# Patient Record
Sex: FEMALE | Race: WHITE | NOT HISPANIC OR LATINO
[De-identification: names, ages, dates, MRNs, and addresses within clinical notes are randomized per-mention and may not be internally consistent; named-entity substitution may affect disease eponyms.]

---

## 2017-03-30 ENCOUNTER — APPOINTMENT (OUTPATIENT)
Dept: INTERNAL MEDICINE | Facility: CLINIC | Age: 67
End: 2017-03-30

## 2017-06-06 ENCOUNTER — OUTPATIENT (OUTPATIENT)
Dept: OUTPATIENT SERVICES | Facility: HOSPITAL | Age: 67
LOS: 1 days | End: 2017-06-06
Payer: MEDICARE

## 2017-06-06 ENCOUNTER — APPOINTMENT (OUTPATIENT)
Dept: CT IMAGING | Facility: CLINIC | Age: 67
End: 2017-06-06

## 2017-06-06 DIAGNOSIS — Z00.8 ENCOUNTER FOR OTHER GENERAL EXAMINATION: ICD-10-CM

## 2017-06-06 PROCEDURE — G0297: CPT

## 2018-01-08 ENCOUNTER — EMERGENCY (EMERGENCY)
Facility: HOSPITAL | Age: 68
LOS: 1 days | Discharge: HOME | End: 2018-01-08
Attending: EMERGENCY MEDICINE | Admitting: STUDENT IN AN ORGANIZED HEALTH CARE EDUCATION/TRAINING PROGRAM
Payer: MEDICARE

## 2018-01-08 VITALS
DIASTOLIC BLOOD PRESSURE: 63 MMHG | SYSTOLIC BLOOD PRESSURE: 126 MMHG | HEART RATE: 72 BPM | OXYGEN SATURATION: 96 % | TEMPERATURE: 98 F | RESPIRATION RATE: 20 BRPM

## 2018-01-08 VITALS
HEIGHT: 62 IN | DIASTOLIC BLOOD PRESSURE: 79 MMHG | OXYGEN SATURATION: 95 % | HEART RATE: 106 BPM | WEIGHT: 115.08 LBS | SYSTOLIC BLOOD PRESSURE: 151 MMHG | TEMPERATURE: 98 F | RESPIRATION RATE: 20 BRPM

## 2018-01-08 LAB
ALBUMIN SERPL ELPH-MCNC: 3.8 G/DL — SIGNIFICANT CHANGE UP (ref 3.3–5.2)
ALP SERPL-CCNC: 66 U/L — SIGNIFICANT CHANGE UP (ref 40–120)
ALT FLD-CCNC: 11 U/L — SIGNIFICANT CHANGE UP
ANION GAP SERPL CALC-SCNC: 14 MMOL/L — SIGNIFICANT CHANGE UP (ref 5–17)
APTT BLD: 32 SEC — SIGNIFICANT CHANGE UP (ref 27.5–37.4)
AST SERPL-CCNC: 16 U/L — SIGNIFICANT CHANGE UP
BILIRUB SERPL-MCNC: 0.3 MG/DL — LOW (ref 0.4–2)
BUN SERPL-MCNC: 18 MG/DL — SIGNIFICANT CHANGE UP (ref 8–20)
CALCIUM SERPL-MCNC: 10 MG/DL — SIGNIFICANT CHANGE UP (ref 8.6–10.2)
CHLORIDE SERPL-SCNC: 101 MMOL/L — SIGNIFICANT CHANGE UP (ref 98–107)
CK SERPL-CCNC: 46 U/L — SIGNIFICANT CHANGE UP (ref 25–170)
CK SERPL-CCNC: 48 U/L — SIGNIFICANT CHANGE UP (ref 25–170)
CO2 SERPL-SCNC: 28 MMOL/L — SIGNIFICANT CHANGE UP (ref 22–29)
CREAT SERPL-MCNC: 0.87 MG/DL — SIGNIFICANT CHANGE UP (ref 0.5–1.3)
GLUCOSE SERPL-MCNC: 134 MG/DL — HIGH (ref 70–115)
HCT VFR BLD CALC: 47 % — SIGNIFICANT CHANGE UP (ref 37–47)
HGB BLD-MCNC: 15.2 G/DL — SIGNIFICANT CHANGE UP (ref 12–16)
INR BLD: 1.04 RATIO — SIGNIFICANT CHANGE UP (ref 0.88–1.16)
MCHC RBC-ENTMCNC: 30 PG — SIGNIFICANT CHANGE UP (ref 27–31)
MCHC RBC-ENTMCNC: 32.3 G/DL — SIGNIFICANT CHANGE UP (ref 32–36)
MCV RBC AUTO: 92.7 FL — SIGNIFICANT CHANGE UP (ref 81–99)
PLATELET # BLD AUTO: 257 K/UL — SIGNIFICANT CHANGE UP (ref 150–400)
POTASSIUM SERPL-MCNC: 3.8 MMOL/L — SIGNIFICANT CHANGE UP (ref 3.5–5.3)
POTASSIUM SERPL-SCNC: 3.8 MMOL/L — SIGNIFICANT CHANGE UP (ref 3.5–5.3)
PROT SERPL-MCNC: 6.6 G/DL — SIGNIFICANT CHANGE UP (ref 6.6–8.7)
PROTHROM AB SERPL-ACNC: 11.5 SEC — SIGNIFICANT CHANGE UP (ref 9.8–12.7)
RBC # BLD: 5.07 M/UL — SIGNIFICANT CHANGE UP (ref 4.4–5.2)
RBC # FLD: 14.7 % — SIGNIFICANT CHANGE UP (ref 11–15.6)
SODIUM SERPL-SCNC: 143 MMOL/L — SIGNIFICANT CHANGE UP (ref 135–145)
TROPONIN T SERPL-MCNC: <0.01 NG/ML — SIGNIFICANT CHANGE UP (ref 0–0.06)
TROPONIN T SERPL-MCNC: <0.01 NG/ML — SIGNIFICANT CHANGE UP (ref 0–0.06)
WBC # BLD: 6.9 K/UL — SIGNIFICANT CHANGE UP (ref 4.8–10.8)
WBC # FLD AUTO: 6.9 K/UL — SIGNIFICANT CHANGE UP (ref 4.8–10.8)

## 2018-01-08 PROCEDURE — 84484 ASSAY OF TROPONIN QUANT: CPT

## 2018-01-08 PROCEDURE — 36415 COLL VENOUS BLD VENIPUNCTURE: CPT

## 2018-01-08 PROCEDURE — 83690 ASSAY OF LIPASE: CPT

## 2018-01-08 PROCEDURE — 93010 ELECTROCARDIOGRAM REPORT: CPT | Mod: 76

## 2018-01-08 PROCEDURE — 93005 ELECTROCARDIOGRAM TRACING: CPT

## 2018-01-08 PROCEDURE — 85610 PROTHROMBIN TIME: CPT

## 2018-01-08 PROCEDURE — 99285 EMERGENCY DEPT VISIT HI MDM: CPT | Mod: 25

## 2018-01-08 PROCEDURE — 99284 EMERGENCY DEPT VISIT MOD MDM: CPT | Mod: 25

## 2018-01-08 PROCEDURE — 85730 THROMBOPLASTIN TIME PARTIAL: CPT

## 2018-01-08 PROCEDURE — 82550 ASSAY OF CK (CPK): CPT

## 2018-01-08 PROCEDURE — 85027 COMPLETE CBC AUTOMATED: CPT

## 2018-01-08 PROCEDURE — 80053 COMPREHEN METABOLIC PANEL: CPT

## 2018-01-08 NOTE — ED ADULT NURSE NOTE - OBJECTIVE STATEMENT
Pt states last night she started to have some epigastric abdominal pain s/p eating ritz crackers which contain soy and soy doesn't agree with her well, she was able to fall asleep after tums but woke up in worse pain, was dizzy while ambulating and pain increased so she came to ED, pt denies n/v/d, denies sick contacts, denies any medical history, denies pain in ED

## 2018-01-08 NOTE — ED PROVIDER NOTE - MEDICAL DECISION MAKING DETAILS
Will evaluate epigastric pain, likely secondary to GI upset, given previous reaction to soy based products, however, given age and active smoking status will check cardiac enzymes.

## 2018-01-08 NOTE — ED PROVIDER NOTE - OBJECTIVE STATEMENT
66 y/o female smoker presents to the ED with c/o sub-sternal chest pain radiating towards back, onset 0300 this morning. Pt states she ate crackers last night with soy, per pt soy "bothers her." Pt reports lightheadedness secondary to pain and abdominal discomfort. Attempted to alleviate with Rolaid. Pt states she was evaluated by cardiologist in the past secondary to positive family cardiac history, negative stress test and ECG a few years ago. Pt denies dizziness, n/v, and any other acute symptoms and complaints at this time.   PMD: Dr. Mann 68 y/o female smoker presents to the ED with c/o sub-sternal chest pain radiating towards back, onset 0300 this morning. Pt states she ate crackers last night with soy, per pt soy "bothers her stomach usually." Pt reports lightheadedness secondary to pain and abdominal discomfort. Attempted to alleviate with Rolaids.  States once EMS arrived symptoms were improving. Upon arrival at the ED she was completely back to baseline. Pt states she was evaluated by cardiologist in the past secondary to positive family cardiac history, negative stress test and ECG a few years ago. Pt denies dizziness, n/v, and any other acute symptoms and complaints at this time. She admits to recurrent gastritis/gerd symptoms and "lives on Maalox" due to sensitivity to many of the GI medications used.  PMD: Dr. Mann

## 2018-01-08 NOTE — ED PROVIDER NOTE - PROGRESS NOTE DETAILS
Received patient signout from Dr. Rose patient with epigastric pain after eating a soy based cracker.  Patient is a tobacco user smokes 1ppd asymptomatic at this time.  Pending 2nd set @ 9. Patient remains asymptomatic.

## 2018-10-04 ENCOUNTER — OTHER (OUTPATIENT)
Age: 68
End: 2018-10-04

## 2018-11-12 ENCOUNTER — NON-APPOINTMENT (OUTPATIENT)
Age: 68
End: 2018-11-12

## 2018-11-12 ENCOUNTER — APPOINTMENT (OUTPATIENT)
Dept: INTERNAL MEDICINE | Facility: CLINIC | Age: 68
End: 2018-11-12
Payer: MEDICARE

## 2018-11-12 VITALS
BODY MASS INDEX: 21.09 KG/M2 | SYSTOLIC BLOOD PRESSURE: 170 MMHG | WEIGHT: 119 LBS | DIASTOLIC BLOOD PRESSURE: 60 MMHG | HEIGHT: 63 IN

## 2018-11-12 DIAGNOSIS — Z00.00 ENCOUNTER FOR GENERAL ADULT MEDICAL EXAMINATION W/OUT ABNORMAL FINDINGS: ICD-10-CM

## 2018-11-12 DIAGNOSIS — F17.200 NICOTINE DEPENDENCE, UNSPECIFIED, UNCOMPLICATED: ICD-10-CM

## 2018-11-12 PROCEDURE — 36415 COLL VENOUS BLD VENIPUNCTURE: CPT

## 2018-11-12 PROCEDURE — 93000 ELECTROCARDIOGRAM COMPLETE: CPT | Mod: 59

## 2018-11-12 PROCEDURE — 99406 BEHAV CHNG SMOKING 3-10 MIN: CPT

## 2018-11-12 PROCEDURE — G0439: CPT

## 2018-11-12 NOTE — COUNSELING
[ - Behavioral Counseling for Obesity, Group (2-10) 30 Minutes] : Behavioral Counseling for Obesity, Group (2-10) 30 Minutes [Smoking cessation counseling provided] : smoking cessation [Tobacco Use Cessation Intermediate Greater Than 3 Minutes Up to 10 Minutes] : Tobacco Use Cessation Intermediate Greater Than 3 Minutes Up to 10 Minutes

## 2018-11-12 NOTE — REVIEW OF SYSTEMS
[Constipation] : constipation [Negative] : Heme/Lymph [Chest Pain] : no chest pain [Palpitations] : no palpitations [Orthopnea] : no orthopnea [Abdominal Pain] : no abdominal pain [FreeTextEntry6] : cig abuse [FreeTextEntry7] : to see gi

## 2018-11-12 NOTE — HEALTH RISK ASSESSMENT
[Excellent] : ~his/her~  mood as  excellent [No falls in past year] : Patient reported no falls in the past year [1] : 2) Feeling down, depressed, or hopeless for several days (1) [HIV test declined] : HIV test declined [Hepatitis C test declined] : Hepatitis C test declined [Behavioral] : behavioral [] : No [[id number]] : 2 [[id number]] : 2 [Patient reported mammogram was normal] : Patient reported mammogram was normal [Patient declined bone density test] : Patient declined bone density test [Patient reported colonoscopy was normal] : Patient reported colonoscopy was normal [Change in mental status noted] : No change in mental status noted [Language] : denies difficulty with language [Behavior] : denies difficulty with behavior [Learning/Retaining New Information] : denies difficulty learning/retaining new information [Handling Complex Tasks] : denies difficulty handling complex tasks [Reasoning] : denies difficulty with reasoning [Spatial Ability and Orientation] : denies difficulty with spatial ability and orientation [MammogramDate] : [id number] [ColonoscopyDate] : 01/01/2011 [ColonoscopyComments] : pending [HepatitisCDate] : 10/01/2017 [HepatitisCComments] : neg

## 2018-11-12 NOTE — HISTORY OF PRESENT ILLNESS
[FreeTextEntry1] : yearly wellness [de-identified] :  routine wellness exam. In the past year she lost her daughter due to diabetes. She has been on the quite a lot of stress states she is otherwise doing well. She has no new complaints\par She refuses all vaccinations for many years

## 2018-11-12 NOTE — PLAN
[FreeTextEntry1] : Wellness exam completed. All issues of health and screening up to date. Immunizations and screening reviewed with patient. All issues of health for the current year discussed in depth with patient. Patient was conversant during the exam and all pertinent issues addressed. Depression screen 2 in chart. Fall prevention was addressed.\par \par Patient was sent for screening mammogram, refuses bone density and all immunizations, and referred to GI for colonoscopy. In addition she states she will continue to smoke and a yearly CAT scan order was placed for low dose CAT scan screening.\par \par The laboratory data was drawn and despite a history of low vitamin D the patient states she will not take vitamin D.\par \par The echocardiogram was of normal limits and she will followup in one year.\par All issues regarding patient's health and medical problems have been discussed. The patient understands and concurs with the treatment plan.

## 2018-11-13 ENCOUNTER — TRANSCRIPTION ENCOUNTER (OUTPATIENT)
Age: 68
End: 2018-11-13

## 2018-11-13 LAB
25(OH)D3 SERPL-MCNC: 14.7 NG/ML
ALBUMIN SERPL ELPH-MCNC: 3.9 G/DL
ALP BLD-CCNC: 65 U/L
ALT SERPL-CCNC: 12 U/L
ANION GAP SERPL CALC-SCNC: 14 MMOL/L
APPEARANCE: CLEAR
AST SERPL-CCNC: 18 U/L
BACTERIA: NEGATIVE
BASOPHILS # BLD AUTO: 0.05 K/UL
BASOPHILS NFR BLD AUTO: 0.6 %
BILIRUB SERPL-MCNC: 0.2 MG/DL
BILIRUBIN URINE: NEGATIVE
BLOOD URINE: NEGATIVE
BUN SERPL-MCNC: 23 MG/DL
CALCIUM SERPL-MCNC: 9.6 MG/DL
CHLORIDE SERPL-SCNC: 104 MMOL/L
CHOLEST SERPL-MCNC: 218 MG/DL
CHOLEST/HDLC SERPL: 3.5 RATIO
CO2 SERPL-SCNC: 26 MMOL/L
COLOR: YELLOW
CREAT SERPL-MCNC: 0.9 MG/DL
EOSINOPHIL # BLD AUTO: 0.23 K/UL
EOSINOPHIL NFR BLD AUTO: 2.6 %
GLUCOSE QUALITATIVE U: NEGATIVE MG/DL
GLUCOSE SERPL-MCNC: 100 MG/DL
HBA1C MFR BLD HPLC: 5.7 %
HCT VFR BLD CALC: 45.6 %
HDLC SERPL-MCNC: 62 MG/DL
HGB BLD-MCNC: 14.8 G/DL
HYALINE CASTS: 5 /LPF
IMM GRANULOCYTES NFR BLD AUTO: 0.3 %
KETONES URINE: NEGATIVE
LDLC SERPL CALC-MCNC: 128 MG/DL
LEUKOCYTE ESTERASE URINE: ABNORMAL
LYMPHOCYTES # BLD AUTO: 2.68 K/UL
LYMPHOCYTES NFR BLD AUTO: 29.9 %
MAN DIFF?: NORMAL
MCHC RBC-ENTMCNC: 30.5 PG
MCHC RBC-ENTMCNC: 32.5 GM/DL
MCV RBC AUTO: 93.8 FL
MICROSCOPIC-UA: NORMAL
MONOCYTES # BLD AUTO: 0.65 K/UL
MONOCYTES NFR BLD AUTO: 7.3 %
NEUTROPHILS # BLD AUTO: 5.31 K/UL
NEUTROPHILS NFR BLD AUTO: 59.3 %
NITRITE URINE: NEGATIVE
PH URINE: 5
PLATELET # BLD AUTO: 337 K/UL
POTASSIUM SERPL-SCNC: 4.8 MMOL/L
PROT SERPL-MCNC: 6.4 G/DL
PROTEIN URINE: NEGATIVE MG/DL
RBC # BLD: 4.86 M/UL
RBC # FLD: 14.8 %
RED BLOOD CELLS URINE: 1 /HPF
SODIUM SERPL-SCNC: 144 MMOL/L
SPECIFIC GRAVITY URINE: 1.02
SQUAMOUS EPITHELIAL CELLS: 4 /HPF
TRIGL SERPL-MCNC: 140 MG/DL
TSH SERPL-ACNC: 1.46 UIU/ML
UROBILINOGEN URINE: NEGATIVE MG/DL
WBC # FLD AUTO: 8.95 K/UL
WHITE BLOOD CELLS URINE: 7 /HPF

## 2018-12-14 ENCOUNTER — APPOINTMENT (OUTPATIENT)
Dept: GASTROENTEROLOGY | Facility: CLINIC | Age: 68
End: 2018-12-14
Payer: MEDICARE

## 2018-12-14 VITALS
BODY MASS INDEX: 20.55 KG/M2 | HEIGHT: 63 IN | OXYGEN SATURATION: 98 % | WEIGHT: 116 LBS | RESPIRATION RATE: 15 BRPM | DIASTOLIC BLOOD PRESSURE: 87 MMHG | SYSTOLIC BLOOD PRESSURE: 165 MMHG | HEART RATE: 89 BPM

## 2018-12-14 DIAGNOSIS — Z86.010 PERSONAL HISTORY OF COLONIC POLYPS: ICD-10-CM

## 2018-12-14 DIAGNOSIS — K57.90 DIVERTICULOSIS OF INTESTINE, PART UNSPECIFIED, W/OUT PERFORATION OR ABSCESS W/OUT BLEEDING: ICD-10-CM

## 2018-12-14 DIAGNOSIS — R12 HEARTBURN: ICD-10-CM

## 2018-12-14 DIAGNOSIS — K59.00 CONSTIPATION, UNSPECIFIED: ICD-10-CM

## 2018-12-14 DIAGNOSIS — Z80.0 FAMILY HISTORY OF MALIGNANT NEOPLASM OF DIGESTIVE ORGANS: ICD-10-CM

## 2018-12-14 DIAGNOSIS — Z12.11 ENCOUNTER FOR SCREENING FOR MALIGNANT NEOPLASM OF COLON: ICD-10-CM

## 2018-12-14 DIAGNOSIS — Z80.9 FAMILY HISTORY OF MALIGNANT NEOPLASM, UNSPECIFIED: ICD-10-CM

## 2018-12-14 PROCEDURE — 82272 OCCULT BLD FECES 1-3 TESTS: CPT

## 2018-12-14 PROCEDURE — 99214 OFFICE O/P EST MOD 30 MIN: CPT

## 2019-01-21 ENCOUNTER — FORM ENCOUNTER (OUTPATIENT)
Age: 69
End: 2019-01-21

## 2019-01-22 ENCOUNTER — OUTPATIENT (OUTPATIENT)
Dept: OUTPATIENT SERVICES | Facility: HOSPITAL | Age: 69
LOS: 1 days | End: 2019-01-22
Payer: MEDICARE

## 2019-01-22 ENCOUNTER — APPOINTMENT (OUTPATIENT)
Dept: CT IMAGING | Facility: CLINIC | Age: 69
End: 2019-01-22
Payer: MEDICARE

## 2019-01-22 DIAGNOSIS — Z12.2 ENCOUNTER FOR SCREENING FOR MALIGNANT NEOPLASM OF RESPIRATORY ORGANS: ICD-10-CM

## 2019-01-22 DIAGNOSIS — Z13.29 ENCOUNTER FOR SCREENING FOR OTHER SUSPECTED ENDOCRINE DISORDER: ICD-10-CM

## 2019-01-22 DIAGNOSIS — Z13.1 ENCOUNTER FOR SCREENING FOR DIABETES MELLITUS: ICD-10-CM

## 2019-01-22 DIAGNOSIS — Z13.220 ENCOUNTER FOR SCREENING FOR LIPOID DISORDERS: ICD-10-CM

## 2019-01-22 DIAGNOSIS — Z87.891 PERSONAL HISTORY OF NICOTINE DEPENDENCE: ICD-10-CM

## 2019-01-22 PROCEDURE — G0297: CPT | Mod: 26

## 2019-01-22 PROCEDURE — G0297: CPT

## 2019-01-24 ENCOUNTER — TRANSCRIPTION ENCOUNTER (OUTPATIENT)
Age: 69
End: 2019-01-24

## 2019-03-07 ENCOUNTER — TRANSCRIPTION ENCOUNTER (OUTPATIENT)
Age: 69
End: 2019-03-07

## 2019-03-13 ENCOUNTER — OUTPATIENT (OUTPATIENT)
Dept: OUTPATIENT SERVICES | Facility: HOSPITAL | Age: 69
LOS: 1 days | Discharge: HOME | End: 2019-03-13
Payer: MEDICARE

## 2019-03-13 ENCOUNTER — APPOINTMENT (OUTPATIENT)
Dept: GASTROENTEROLOGY | Facility: GI CENTER | Age: 69
End: 2019-03-13
Payer: MEDICARE

## 2019-03-13 ENCOUNTER — RESULT REVIEW (OUTPATIENT)
Age: 69
End: 2019-03-13

## 2019-03-13 DIAGNOSIS — Z12.11 ENCOUNTER FOR SCREENING FOR MALIGNANT NEOPLASM OF COLON: ICD-10-CM

## 2019-03-13 PROCEDURE — 88305 TISSUE EXAM BY PATHOLOGIST: CPT

## 2019-03-13 PROCEDURE — 88305 TISSUE EXAM BY PATHOLOGIST: CPT | Mod: 26

## 2019-03-13 PROCEDURE — 45380 COLONOSCOPY AND BIOPSY: CPT

## 2019-03-13 RX ORDER — IBUPROFEN 200 MG/1
TABLET, COATED ORAL
Refills: 0 | Status: ACTIVE | COMMUNITY

## 2019-03-13 RX ORDER — CALCIUM CARBONATE 500 MG/1
500 TABLET, CHEWABLE ORAL
Refills: 0 | Status: ACTIVE | COMMUNITY

## 2019-03-13 NOTE — REASON FOR VISIT
[Procedure: _________] : a [unfilled] procedure visit [FreeTextEntry1] : colonoscopy [Colonoscopy] : a colonoscopy [FreeTextEntry2] : change in bowel habits, constipation

## 2019-03-13 NOTE — PROCEDURE
[With Polypectomy] : polypectomy [Procedure Explained] : The procedure was explained [Allergies Reviewed] : allergies reviewed. [Risks] : Risks [Benefits] : benefits [Alternatives] : alternatives [Bleeding] : bleeding risk [Infection] : risk of infection [Consent Obtained] : written consent was obtained prior to the procedure and is detailed in the patient's record [Patient] : the patient [Bowel Prep Kit] : the patient took the appropriate bowel preparation kit as directed [Approved Diet Followed] : the patient avoided solid foods and adhered to the approved diet list for 24 hours prior to the procedure [Automated Blood Pressure Cuff] : automated blood pressure cuff [Cardiac Monitor] : cardiac monitor [Pulse Oximeter] : pulse oximeter [2] : 2 [Sedation Clearance] : the patient was cleared for moderate sedation [Prep Qualtiy: ___] : Prep Quality:  [unfilled] [Withdrawal Time: ___] : Withdrawal Time:  [unfilled] [Performed By: ___] : Performed by:  RADAMES [Left Lateral Decubitus] : The patient was positioned in the left lateral decubitus position [Abnormal Rectum] : a normal rectum [External Hemorrhoids] : no external hemorrhoids [Cecum (Landmarks/Transillum)] : and guided to the cecum which was identified by the anatomic landmarks of the appendiceal orifice and ileocecal valve and by transillumination in the right lower quadrant [Insufflated] : insufflated [Single Pass Needed] : after a single pass [Retroflex View] : a retroflex view of the rectum was performed [Patient Rotated Into Alternating Positions] : the patient was not rotated [Normal] : Normal [Diverticulosis] : diverticulosis [Hemorrhoids] : hemorrhoids [Sent to Pathology] : was sent to pathology for analysis [Vital Signs Stable] : the vital signs were stable [No Complications] : There were no complications [de-identified] : change in bowel habits [de-identified] :  There were four 3 mm sessile polyps removed with cold forceps. two diverticula noted

## 2019-03-13 NOTE — ASSESSMENT
[FreeTextEntry1] : A/P\par 4 small polyps removed\par colonoscopy in 3-5 years\par diverticulosis, hemorrhoids

## 2019-03-13 NOTE — PROCEDURE
[With Polypectomy] : polypectomy [Procedure Explained] : The procedure was explained [Allergies Reviewed] : allergies reviewed. [Risks] : Risks [Benefits] : benefits [Alternatives] : alternatives [Bleeding] : bleeding risk [Infection] : risk of infection [Consent Obtained] : written consent was obtained prior to the procedure and is detailed in the patient's record [Patient] : the patient [Bowel Prep Kit] : the patient took the appropriate bowel preparation kit as directed [Approved Diet Followed] : the patient avoided solid foods and adhered to the approved diet list for 24 hours prior to the procedure [Automated Blood Pressure Cuff] : automated blood pressure cuff [Cardiac Monitor] : cardiac monitor [Pulse Oximeter] : pulse oximeter [2] : 2 [Sedation Clearance] : the patient was cleared for moderate sedation [Prep Qualtiy: ___] : Prep Quality:  [unfilled] [Withdrawal Time: ___] : Withdrawal Time:  [unfilled] [Performed By: ___] : Performed by:  RADAMES [Left Lateral Decubitus] : The patient was positioned in the left lateral decubitus position [Abnormal Rectum] : a normal rectum [External Hemorrhoids] : no external hemorrhoids [Cecum (Landmarks/Transillum)] : and guided to the cecum which was identified by the anatomic landmarks of the appendiceal orifice and ileocecal valve and by transillumination in the right lower quadrant [Insufflated] : insufflated [Single Pass Needed] : after a single pass [Retroflex View] : a retroflex view of the rectum was performed [Patient Rotated Into Alternating Positions] : the patient was not rotated [Normal] : Normal [Diverticulosis] : diverticulosis [Hemorrhoids] : hemorrhoids [Sent to Pathology] : was sent to pathology for analysis [Vital Signs Stable] : the vital signs were stable [No Complications] : There were no complications [de-identified] : change in bowel habits [de-identified] :  There were four 3 mm sessile polyps removed with cold forceps. two diverticula noted

## 2019-03-15 LAB — SURGICAL PATHOLOGY STUDY: SIGNIFICANT CHANGE UP

## 2019-03-25 ENCOUNTER — APPOINTMENT (OUTPATIENT)
Dept: GASTROENTEROLOGY | Facility: GI CENTER | Age: 69
End: 2019-03-25

## 2019-03-29 ENCOUNTER — APPOINTMENT (OUTPATIENT)
Dept: GASTROENTEROLOGY | Facility: CLINIC | Age: 69
End: 2019-03-29

## 2019-04-16 ENCOUNTER — APPOINTMENT (OUTPATIENT)
Dept: INTERNAL MEDICINE | Facility: CLINIC | Age: 69
End: 2019-04-16
Payer: MEDICARE

## 2019-04-16 VITALS
SYSTOLIC BLOOD PRESSURE: 180 MMHG | DIASTOLIC BLOOD PRESSURE: 80 MMHG | WEIGHT: 117 LBS | HEIGHT: 63 IN | BODY MASS INDEX: 20.73 KG/M2

## 2019-04-16 DIAGNOSIS — K76.89 OTHER SPECIFIED DISEASES OF LIVER: ICD-10-CM

## 2019-04-16 DIAGNOSIS — R10.9 UNSPECIFIED ABDOMINAL PAIN: ICD-10-CM

## 2019-04-16 PROCEDURE — 99213 OFFICE O/P EST LOW 20 MIN: CPT

## 2019-04-16 RX ORDER — RANITIDINE 150 MG/1
150 TABLET ORAL DAILY
Qty: 30 | Refills: 3 | Status: DISCONTINUED | COMMUNITY
Start: 2018-12-14 | End: 2019-04-16

## 2019-04-16 NOTE — PHYSICAL EXAM
[No Acute Distress] : no acute distress [Well Nourished] : well nourished [Well Developed] : well developed [Well-Appearing] : well-appearing [Normal Sclera/Conjunctiva] : normal sclera/conjunctiva [PERRL] : pupils equal round and reactive to light [EOMI] : extraocular movements intact [Normal Outer Ear/Nose] : the outer ears and nose were normal in appearance [Normal Oropharynx] : the oropharynx was normal [Supple] : supple [No JVD] : no jugular venous distention [No Respiratory Distress] : no respiratory distress  [No Lymphadenopathy] : no lymphadenopathy [Thyroid Normal, No Nodules] : the thyroid was normal and there were no nodules present [Clear to Auscultation] : lungs were clear to auscultation bilaterally [Normal Rate] : normal rate  [No Accessory Muscle Use] : no accessory muscle use [Normal S1, S2] : normal S1 and S2 [Regular Rhythm] : with a regular rhythm [No Murmur] : no murmur heard [No Abdominal Bruit] : a ~M bruit was not heard ~T in the abdomen [No Carotid Bruits] : no carotid bruits [No Varicosities] : no varicosities [Pedal Pulses Present] : the pedal pulses are present [No Edema] : there was no peripheral edema [No Extremity Clubbing/Cyanosis] : no extremity clubbing/cyanosis [No Palpable Aorta] : no palpable aorta [Soft] : abdomen soft [Non-distended] : non-distended [Non Tender] : non-tender [No HSM] : no HSM [No Masses] : no abdominal mass palpated [Normal Posterior Cervical Nodes] : no posterior cervical lymphadenopathy [Normal Anterior Cervical Nodes] : no anterior cervical lymphadenopathy [Normal Bowel Sounds] : normal bowel sounds [No Spinal Tenderness] : no spinal tenderness [No CVA Tenderness] : no CVA  tenderness [No Joint Swelling] : no joint swelling [No Rash] : no rash [Grossly Normal Strength/Tone] : grossly normal strength/tone [Coordination Grossly Intact] : coordination grossly intact [Normal Gait] : normal gait [Deep Tendon Reflexes (DTR)] : deep tendon reflexes were 2+ and symmetric [No Focal Deficits] : no focal deficits [Normal Affect] : the affect was normal [Normal Insight/Judgement] : insight and judgment were intact [de-identified] : Area of discomfort the left lower quadrant corresponds to palpation of her lower rib

## 2019-04-16 NOTE — PLAN
[FreeTextEntry1] : She has normal carotid artery evaluation. The lump she felt in the lower left side is floating rib. Normal examination. Patient is concerned because daughter with polycystic kidney disease so a screening sonogram was ordered of her liver and kidneys. Her blood pressure was slightly elevated we'll follow up in 2 months

## 2019-04-16 NOTE — HISTORY OF PRESENT ILLNESS
[FreeTextEntry1] : wants to go over cat scan of lungs/ and history of cysts [de-identified] : DAUGHTER WITH PCKD - PT CONCERNED\par CONCERNED ABOUT CAROTIDS  - SL DIZZY \par LITTLE LUMP ON SIDE

## 2019-04-25 ENCOUNTER — FORM ENCOUNTER (OUTPATIENT)
Age: 69
End: 2019-04-25

## 2019-04-26 ENCOUNTER — OUTPATIENT (OUTPATIENT)
Dept: OUTPATIENT SERVICES | Facility: HOSPITAL | Age: 69
LOS: 1 days | End: 2019-04-26
Payer: MEDICARE

## 2019-04-26 ENCOUNTER — APPOINTMENT (OUTPATIENT)
Dept: ULTRASOUND IMAGING | Facility: CLINIC | Age: 69
End: 2019-04-26
Payer: MEDICARE

## 2019-04-26 DIAGNOSIS — R10.9 UNSPECIFIED ABDOMINAL PAIN: ICD-10-CM

## 2019-04-26 PROCEDURE — 76700 US EXAM ABDOM COMPLETE: CPT | Mod: 26

## 2019-04-26 PROCEDURE — 76700 US EXAM ABDOM COMPLETE: CPT

## 2019-06-03 ENCOUNTER — APPOINTMENT (OUTPATIENT)
Dept: GASTROENTEROLOGY | Facility: GI CENTER | Age: 69
End: 2019-06-03

## 2019-07-17 ENCOUNTER — APPOINTMENT (OUTPATIENT)
Dept: INTERNAL MEDICINE | Facility: CLINIC | Age: 69
End: 2019-07-17
Payer: MEDICARE

## 2019-07-17 VITALS
WEIGHT: 115 LBS | BODY MASS INDEX: 20.38 KG/M2 | HEIGHT: 63 IN | DIASTOLIC BLOOD PRESSURE: 75 MMHG | SYSTOLIC BLOOD PRESSURE: 170 MMHG

## 2019-07-17 PROCEDURE — 99212 OFFICE O/P EST SF 10 MIN: CPT

## 2019-08-19 NOTE — ED ADULT NURSE NOTE - CHIEF COMPLAINT
Bed Search Update:  [facility]-no beds  [facility]-no beds  [facility]-no beds The patient is a 67y Female complaining of chest pain.

## 2019-08-21 NOTE — ASSESSMENT
[FreeTextEntry1] : Patient examined and adjustments to therapy for HBP WAS not required as multiple blood pressure readings were approximately 150/75. Patient's own blood pressure cuff was incorrect as it read at 115/70 and therefore is unreliable. Patient is extremely anxious and therefore the blood pressure readings and optic were helpful. The treatment will be offered All labs reviewed with patient as well. Review of systems and physical exam discussed with patient. Care plan for future followups discussed with patient. All issues of health for the current year discussed in depth with patient who was conversant and all relevant issues addressed.

## 2019-08-21 NOTE — HISTORY OF PRESENT ILLNESS
[FreeTextEntry1] : blood pressure check [de-identified] : Patient has been monitoring her blood pressure home and is getting low readings. She comes in today for blood pressure check. She is otherwise without sxs and has been un controlled in past

## 2019-08-29 ENCOUNTER — APPOINTMENT (OUTPATIENT)
Dept: INTERNAL MEDICINE | Facility: CLINIC | Age: 69
End: 2019-08-29
Payer: MEDICARE

## 2019-08-29 VITALS
DIASTOLIC BLOOD PRESSURE: 70 MMHG | HEIGHT: 63 IN | BODY MASS INDEX: 20.38 KG/M2 | WEIGHT: 115 LBS | SYSTOLIC BLOOD PRESSURE: 150 MMHG

## 2019-08-29 DIAGNOSIS — R09.89 OTHER SPECIFIED SYMPTOMS AND SIGNS INVOLVING THE CIRCULATORY AND RESPIRATORY SYSTEMS: ICD-10-CM

## 2019-08-29 PROCEDURE — 99214 OFFICE O/P EST MOD 30 MIN: CPT

## 2019-08-29 NOTE — PHYSICAL EXAM
[No Acute Distress] : no acute distress [Well Nourished] : well nourished [Well Developed] : well developed [Well-Appearing] : well-appearing [Normal Sclera/Conjunctiva] : normal sclera/conjunctiva [PERRL] : pupils equal round and reactive to light [EOMI] : extraocular movements intact [Normal Outer Ear/Nose] : the outer ears and nose were normal in appearance [Normal Oropharynx] : the oropharynx was normal [No JVD] : no jugular venous distention [Supple] : supple [No Lymphadenopathy] : no lymphadenopathy [Thyroid Normal, No Nodules] : the thyroid was normal and there were no nodules present [No Respiratory Distress] : no respiratory distress  [No Accessory Muscle Use] : no accessory muscle use [Clear to Auscultation] : lungs were clear to auscultation bilaterally [Normal Rate] : normal rate  [Regular Rhythm] : with a regular rhythm [Normal S1, S2] : normal S1 and S2 [No Murmur] : no murmur heard [No Carotid Bruits] : no carotid bruits [No Abdominal Bruit] : a ~M bruit was not heard ~T in the abdomen [No Varicosities] : no varicosities [Pedal Pulses Present] : the pedal pulses are present [No Edema] : there was no peripheral edema [No Palpable Aorta] : no palpable aorta [No Extremity Clubbing/Cyanosis] : no extremity clubbing/cyanosis [Soft] : abdomen soft [Non-distended] : non-distended [Non Tender] : non-tender [No Masses] : no abdominal mass palpated [Normal Bowel Sounds] : normal bowel sounds [No HSM] : no HSM [Normal Posterior Cervical Nodes] : no posterior cervical lymphadenopathy [Normal Anterior Cervical Nodes] : no anterior cervical lymphadenopathy [No CVA Tenderness] : no CVA  tenderness [No Joint Swelling] : no joint swelling [No Spinal Tenderness] : no spinal tenderness [Grossly Normal Strength/Tone] : grossly normal strength/tone [No Rash] : no rash [Coordination Grossly Intact] : coordination grossly intact [No Focal Deficits] : no focal deficits [Normal Gait] : normal gait [Deep Tendon Reflexes (DTR)] : deep tendon reflexes were 2+ and symmetric [Normal Affect] : the affect was normal [Normal Insight/Judgement] : insight and judgment were intact

## 2019-08-29 NOTE — HISTORY OF PRESENT ILLNESS
[de-identified] : Patient is here in followup to hypertension. She's been obsessing over her blood pressure readings at home which have been elevated as well as increased heart rate. She's under stress with illness of her daughter. Her blood pressure has been borderline but she has refused therapy in the past [FreeTextEntry1] : Patient here in follow up to last visit and prior issue\par

## 2019-08-29 NOTE — ASSESSMENT
[FreeTextEntry1] : Patient examined and adjustments to therapy for HBP required this visit as patient given metoprolol tartrate 5 ER daily. This is both for moderate control of her blood pressure and heart rate. He should also help with anxiety. Had a long conversation with the patient regarding the need for treatment at this time and she can All labs reviewed with patient as well. Review of systems and physical exam discussed with patient. Care plan for future followups discussed with patient. All issues of health for the current year discussed in depth with patient who was conversant and all relevant issues addressed.\par \par All issues regarding patient's health and medical problems have been discussed. The patient understands and concurs with the treatment plan.

## 2019-10-15 ENCOUNTER — APPOINTMENT (OUTPATIENT)
Dept: INTERNAL MEDICINE | Facility: CLINIC | Age: 69
End: 2019-10-15
Payer: MEDICARE

## 2019-10-15 VITALS
WEIGHT: 116 LBS | DIASTOLIC BLOOD PRESSURE: 60 MMHG | SYSTOLIC BLOOD PRESSURE: 180 MMHG | BODY MASS INDEX: 20.55 KG/M2 | HEIGHT: 63 IN

## 2019-10-15 PROCEDURE — 99213 OFFICE O/P EST LOW 20 MIN: CPT

## 2019-10-15 NOTE — ASSESSMENT
[FreeTextEntry1] : Patient examined and adjustments to therapy for HBP might be required the patient has difficulty with medications. All labs reviewed with patient as well. Review of systems and physical exam discussed with patient. Care plan for future followups discussed with patient. All issues of health for the current year discussed in depth with patient who was conversant and all relevant issues addressed.\par Plan is to maintain metoprolol half a day for now and slowly increase to full pill and observe for side effects. Her pressure is suboptimal amount the patient will self monitor at home. Alternative medications including verapamil were discussed to control both her elevated heart rate and hypertension. This will be discussed at next visit\par

## 2019-10-15 NOTE — PHYSICAL EXAM
[No Acute Distress] : no acute distress [Well Nourished] : well nourished [Well Developed] : well developed [Well-Appearing] : well-appearing [Normal Sclera/Conjunctiva] : normal sclera/conjunctiva [PERRL] : pupils equal round and reactive to light [EOMI] : extraocular movements intact [Normal Oropharynx] : the oropharynx was normal [Normal Outer Ear/Nose] : the outer ears and nose were normal in appearance [No JVD] : no jugular venous distention [No Lymphadenopathy] : no lymphadenopathy [Supple] : supple [No Respiratory Distress] : no respiratory distress  [Thyroid Normal, No Nodules] : the thyroid was normal and there were no nodules present [Clear to Auscultation] : lungs were clear to auscultation bilaterally [No Accessory Muscle Use] : no accessory muscle use [Normal Rate] : normal rate  [Regular Rhythm] : with a regular rhythm [No Carotid Bruits] : no carotid bruits [Normal S1, S2] : normal S1 and S2 [No Murmur] : no murmur heard [No Varicosities] : no varicosities [No Abdominal Bruit] : a ~M bruit was not heard ~T in the abdomen [No Edema] : there was no peripheral edema [Pedal Pulses Present] : the pedal pulses are present [No Extremity Clubbing/Cyanosis] : no extremity clubbing/cyanosis [No Palpable Aorta] : no palpable aorta [Non Tender] : non-tender [Soft] : abdomen soft [Non-distended] : non-distended [No Masses] : no abdominal mass palpated [No HSM] : no HSM [Normal Bowel Sounds] : normal bowel sounds [Normal Posterior Cervical Nodes] : no posterior cervical lymphadenopathy [Normal Anterior Cervical Nodes] : no anterior cervical lymphadenopathy [No CVA Tenderness] : no CVA  tenderness [No Spinal Tenderness] : no spinal tenderness [No Joint Swelling] : no joint swelling [Grossly Normal Strength/Tone] : grossly normal strength/tone [No Rash] : no rash [Coordination Grossly Intact] : coordination grossly intact [No Focal Deficits] : no focal deficits [Normal Gait] : normal gait [Normal Affect] : the affect was normal [Deep Tendon Reflexes (DTR)] : deep tendon reflexes were 2+ and symmetric [Normal Insight/Judgement] : insight and judgment were intact

## 2019-10-15 NOTE — HISTORY OF PRESENT ILLNESS
[FreeTextEntry1] : Patient here in follow up to last visit and prior issue\par  [de-identified] : Patient reduced meds to .5 pill\par Patient here for hypertension checkup. She complained of onset of headache since menopause self reduce dose to half pill a day. She also feels sluggish

## 2019-12-26 ENCOUNTER — APPOINTMENT (OUTPATIENT)
Dept: INTERNAL MEDICINE | Facility: CLINIC | Age: 69
End: 2019-12-26

## 2020-01-13 PROBLEM — Z13.29 SCREENING FOR THYROID DISORDER: Status: ACTIVE | Noted: 2018-11-12

## 2020-01-14 ENCOUNTER — NON-APPOINTMENT (OUTPATIENT)
Age: 70
End: 2020-01-14

## 2020-01-14 ENCOUNTER — APPOINTMENT (OUTPATIENT)
Dept: INTERNAL MEDICINE | Facility: CLINIC | Age: 70
End: 2020-01-14
Payer: MEDICARE

## 2020-01-14 VITALS
BODY MASS INDEX: 20.55 KG/M2 | WEIGHT: 116 LBS | DIASTOLIC BLOOD PRESSURE: 70 MMHG | HEIGHT: 63 IN | SYSTOLIC BLOOD PRESSURE: 150 MMHG

## 2020-01-14 DIAGNOSIS — Z13.29 ENCOUNTER FOR SCREENING FOR OTHER SUSPECTED ENDOCRINE DISORDER: ICD-10-CM

## 2020-01-14 PROCEDURE — 99214 OFFICE O/P EST MOD 30 MIN: CPT | Mod: 25

## 2020-01-14 PROCEDURE — 99406 BEHAV CHNG SMOKING 3-10 MIN: CPT

## 2020-01-14 PROCEDURE — 36415 COLL VENOUS BLD VENIPUNCTURE: CPT

## 2020-01-14 PROCEDURE — G0439: CPT

## 2020-01-14 PROCEDURE — G0296 VISIT TO DETERM LDCT ELIG: CPT

## 2020-01-14 PROCEDURE — G0444 DEPRESSION SCREEN ANNUAL: CPT

## 2020-01-14 PROCEDURE — G0405: CPT

## 2020-01-14 RX ORDER — CALCIUM CARB/VITAMIN D3/VIT K1 650MG-12.5
TABLET,CHEWABLE ORAL
Refills: 0 | Status: ACTIVE | COMMUNITY

## 2020-01-14 RX ORDER — CALCIUM PHOSPHATE TRIB/VIT D3 250-0.01MG
250-400 TABLET,CHEWABLE ORAL
Refills: 0 | Status: DISCONTINUED | COMMUNITY
End: 2020-01-14

## 2020-01-14 NOTE — ADDENDUM
[FreeTextEntry1] : Patient examined and adjustments to therapy for HBP not required as normal today All labs reviewed with patient as well. Review of systems and physical exam discussed with patient. Care plan for future followups discussed with patient. All issues of health for the current year discussed in depth with patient who was conversant and all relevant issues addressed.\par \par Wellness exam completed. All issues of health and screening up to date. Immunizations and screening reviewed with patient. All issues of health for the current year discussed in depth with patient. Patient was conversant during the exam and all pertinent issues addressed. Depression screen 2 in chart. Fall prevention was addressed.\par \par Patient was also screened for lung cancer and smoking cessation and will followup for yearly annual CAT scan and still will continue smoking\par \par All issues regarding patient's health and medical problems have been discussed. The patient understands and concurs with the treatment plan.

## 2020-01-14 NOTE — HISTORY OF PRESENT ILLNESS
[FreeTextEntry1] : Annual wellness and Patient here in follow up to last visit and prior issue\par  [de-identified] : Patient here routine followup to her multiple medical problems. She continues to smoke. She required as her yearly screening CAT scan. She is compliant with all her medications and up-to-date on screening. She still refuses vaccinations

## 2020-01-14 NOTE — PHYSICAL EXAM
[No Acute Distress] : no acute distress [Well Nourished] : well nourished [Well Developed] : well developed [Well-Appearing] : well-appearing [Normal Sclera/Conjunctiva] : normal sclera/conjunctiva [PERRL] : pupils equal round and reactive to light [Normal Outer Ear/Nose] : the outer ears and nose were normal in appearance [EOMI] : extraocular movements intact [No Lymphadenopathy] : no lymphadenopathy [Normal Oropharynx] : the oropharynx was normal [No JVD] : no jugular venous distention [Supple] : supple [Thyroid Normal, No Nodules] : the thyroid was normal and there were no nodules present [No Respiratory Distress] : no respiratory distress  [No Accessory Muscle Use] : no accessory muscle use [Normal Rate] : normal rate  [Regular Rhythm] : with a regular rhythm [Clear to Auscultation] : lungs were clear to auscultation bilaterally [Normal S1, S2] : normal S1 and S2 [No Murmur] : no murmur heard [No Carotid Bruits] : no carotid bruits [No Varicosities] : no varicosities [Pedal Pulses Present] : the pedal pulses are present [No Abdominal Bruit] : a ~M bruit was not heard ~T in the abdomen [No Palpable Aorta] : no palpable aorta [No Edema] : there was no peripheral edema [Soft] : abdomen soft [No Extremity Clubbing/Cyanosis] : no extremity clubbing/cyanosis [Non Tender] : non-tender [No Masses] : no abdominal mass palpated [Non-distended] : non-distended [No HSM] : no HSM [Normal Posterior Cervical Nodes] : no posterior cervical lymphadenopathy [Normal Bowel Sounds] : normal bowel sounds [Normal Anterior Cervical Nodes] : no anterior cervical lymphadenopathy [No Spinal Tenderness] : no spinal tenderness [No CVA Tenderness] : no CVA  tenderness [No Joint Swelling] : no joint swelling [Grossly Normal Strength/Tone] : grossly normal strength/tone [No Rash] : no rash [Coordination Grossly Intact] : coordination grossly intact [Normal Gait] : normal gait [No Focal Deficits] : no focal deficits [Normal Affect] : the affect was normal [Normal Insight/Judgement] : insight and judgment were intact [Deep Tendon Reflexes (DTR)] : deep tendon reflexes were 2+ and symmetric

## 2020-01-14 NOTE — HISTORY OF PRESENT ILLNESS
[FreeTextEntry1] : Annual wellness and Patient here in follow up to last visit and prior issue\par  [de-identified] : Patient here routine followup to her multiple medical problems. She continues to smoke. She required as her yearly screening CAT scan. She is compliant with all her medications and up-to-date on screening. She still refuses vaccinations

## 2020-01-14 NOTE — END OF VISIT
[FreeTextEntry2] : All labs reviewed :\par \par  [>50% of Time Spent on Counseling for ____] : Greater than 50% of the encounter time was spent on counseling for [unfilled] [Time Spent: ___ minutes] : I have spent [unfilled] minutes of face to face time with the patient

## 2020-01-14 NOTE — HEALTH RISK ASSESSMENT
[Very Good] : ~his/her~  mood as very good [] : Yes [No] : No [No falls in past year] : Patient reported no falls in the past year [Patient reported colonoscopy was normal] : Patient reported colonoscopy was normal [HIV test declined] : HIV test declined [Patient reported mammogram was normal] : Patient reported mammogram was normal [Hepatitis C test declined] : Hepatitis C test declined [Fully functional (using the telephone, shopping, preparing meals, housekeeping, doing laundry, using] : Fully functional and needs no help or supervision to perform IADLs (using the telephone, shopping, preparing meals, housekeeping, doing laundry, using transportation, managing medications and managing finances) [Fully functional (bathing, dressing, toileting, transferring, walking, feeding)] : Fully functional (bathing, dressing, toileting, transferring, walking, feeding) [1] : 2) Feeling down, depressed, or hopeless for several days (1) [30 or more] : 30 or more [[id number]] : 2 [LowDoseCTScan] : 01/2019 [Language] : denies difficulty with language [Change in mental status noted] : No change in mental status noted [Handling Complex Tasks] : denies difficulty handling complex tasks [MammogramDate] : 01/16/2019 [ColonoscopyDate] : 03/12/2019

## 2020-01-14 NOTE — HEALTH RISK ASSESSMENT
[Very Good] : ~his/her~  mood as very good [] : Yes [No] : No [No falls in past year] : Patient reported no falls in the past year [Patient reported colonoscopy was normal] : Patient reported colonoscopy was normal [Patient reported mammogram was normal] : Patient reported mammogram was normal [HIV test declined] : HIV test declined [Hepatitis C test declined] : Hepatitis C test declined [Fully functional (bathing, dressing, toileting, transferring, walking, feeding)] : Fully functional (bathing, dressing, toileting, transferring, walking, feeding) [Fully functional (using the telephone, shopping, preparing meals, housekeeping, doing laundry, using] : Fully functional and needs no help or supervision to perform IADLs (using the telephone, shopping, preparing meals, housekeeping, doing laundry, using transportation, managing medications and managing finances) [1] : 2) Feeling down, depressed, or hopeless for several days (1) [30 or more] : 30 or more [YUI0Ntrug] : 2 [LowDoseCTScan] : 01/2019 [Language] : denies difficulty with language [Change in mental status noted] : No change in mental status noted [Handling Complex Tasks] : denies difficulty handling complex tasks [MammogramDate] : 01/16/2019 [ColonoscopyDate] : 03/12/2019

## 2020-01-14 NOTE — COUNSELING
[Risk of tobacco use and health benefits of smoking cessation discussed] : Risk of tobacco use and health benefits of smoking cessation discussed [Cessation strategies including cessation program discussed] : Cessation strategies including cessation program discussed [Tobacco Use Cessation Intermediate Greater Than 3 Minutes Up to 10 Minutes] : Tobacco Use Cessation Intermediate Greater Than 3 Minutes Up to 10 Minutes [ - Annual Lung Cancer Screening/Share Decision Making Discussion] : Annual Lung Cancer Screening/Share Decision Making Discussion. (I have advised this patient to have a Low Dose CT (LDCT) scan of the lungs and have discussed the following with the patient in a shared decision making discussion:   Benefits of Detection and Early Treatment: There is adequate evidence that annual screening for lung cancer with LDCT in a population of high-risk persons can prevent a substantial number of lung cancer–related deaths. The magnitude of benefit depends on the individual patient's risk for lung cancer, as those who are at highest risk are most likely to benefit. Screening cannot prevent most lung cancer–related deaths, and does not replace smoking cessation. Harms of Detection and Early Intervention and Treatment: The harms associated with LDCT screening include false-negative and false-positive results, incidental findings, over diagnosis, and radiation exposure. False-positive LDCT results occur in a substantial proportion of screened persons; 95% of all positive results do not lead to a diagnosis of cancer. In a high-quality screening program, further imaging can resolve most false-positive results; however, some patients may require invasive procedures. Radiation harms, including cancer resulting from cumulative exposure to radiation, vary depending on the age at the start of screening; the number of scans received; and the person's exposure to other sources of radiation, particularly other medical imaging.) [None] : None [Willing to Quit Smoking] : Not willing to quit smoking [FreeTextEntry1] : 5

## 2020-01-14 NOTE — COUNSELING
[Risk of tobacco use and health benefits of smoking cessation discussed] : Risk of tobacco use and health benefits of smoking cessation discussed [Cessation strategies including cessation program discussed] : Cessation strategies including cessation program discussed [ - Annual Lung Cancer Screening/Share Decision Making Discussion] : Annual Lung Cancer Screening/Share Decision Making Discussion. (I have advised this patient to have a Low Dose CT (LDCT) scan of the lungs and have discussed the following with the patient in a shared decision making discussion:   Benefits of Detection and Early Treatment: There is adequate evidence that annual screening for lung cancer with LDCT in a population of high-risk persons can prevent a substantial number of lung cancer–related deaths. The magnitude of benefit depends on the individual patient's risk for lung cancer, as those who are at highest risk are most likely to benefit. Screening cannot prevent most lung cancer–related deaths, and does not replace smoking cessation. Harms of Detection and Early Intervention and Treatment: The harms associated with LDCT screening include false-negative and false-positive results, incidental findings, over diagnosis, and radiation exposure. False-positive LDCT results occur in a substantial proportion of screened persons; 95% of all positive results do not lead to a diagnosis of cancer. In a high-quality screening program, further imaging can resolve most false-positive results; however, some patients may require invasive procedures. Radiation harms, including cancer resulting from cumulative exposure to radiation, vary depending on the age at the start of screening; the number of scans received; and the person's exposure to other sources of radiation, particularly other medical imaging.) [Tobacco Use Cessation Intermediate Greater Than 3 Minutes Up to 10 Minutes] : Tobacco Use Cessation Intermediate Greater Than 3 Minutes Up to 10 Minutes [None] : None [Willing to Quit Smoking] : Not willing to quit smoking [FreeTextEntry1] : 5

## 2020-01-14 NOTE — REVIEW OF SYSTEMS
[Abdominal Pain] : abdominal pain [Nausea] : nausea [Constipation] : constipation [Negative] : Heme/Lymph [FreeTextEntry7] : neg colon

## 2020-01-14 NOTE — PHYSICAL EXAM
[No Acute Distress] : no acute distress [Well Nourished] : well nourished [Well Developed] : well developed [Well-Appearing] : well-appearing [Normal Sclera/Conjunctiva] : normal sclera/conjunctiva [PERRL] : pupils equal round and reactive to light [EOMI] : extraocular movements intact [Normal Outer Ear/Nose] : the outer ears and nose were normal in appearance [No JVD] : no jugular venous distention [No Lymphadenopathy] : no lymphadenopathy [Normal Oropharynx] : the oropharynx was normal [Thyroid Normal, No Nodules] : the thyroid was normal and there were no nodules present [Supple] : supple [No Accessory Muscle Use] : no accessory muscle use [No Respiratory Distress] : no respiratory distress  [Regular Rhythm] : with a regular rhythm [Normal Rate] : normal rate  [Clear to Auscultation] : lungs were clear to auscultation bilaterally [No Murmur] : no murmur heard [Normal S1, S2] : normal S1 and S2 [No Carotid Bruits] : no carotid bruits [No Varicosities] : no varicosities [No Abdominal Bruit] : a ~M bruit was not heard ~T in the abdomen [Pedal Pulses Present] : the pedal pulses are present [No Palpable Aorta] : no palpable aorta [No Edema] : there was no peripheral edema [Soft] : abdomen soft [No Extremity Clubbing/Cyanosis] : no extremity clubbing/cyanosis [Non Tender] : non-tender [Non-distended] : non-distended [No Masses] : no abdominal mass palpated [No HSM] : no HSM [Normal Bowel Sounds] : normal bowel sounds [Normal Posterior Cervical Nodes] : no posterior cervical lymphadenopathy [Normal Anterior Cervical Nodes] : no anterior cervical lymphadenopathy [No CVA Tenderness] : no CVA  tenderness [No Spinal Tenderness] : no spinal tenderness [No Joint Swelling] : no joint swelling [Grossly Normal Strength/Tone] : grossly normal strength/tone [No Rash] : no rash [Coordination Grossly Intact] : coordination grossly intact [No Focal Deficits] : no focal deficits [Normal Gait] : normal gait [Normal Insight/Judgement] : insight and judgment were intact [Normal Affect] : the affect was normal [Deep Tendon Reflexes (DTR)] : deep tendon reflexes were 2+ and symmetric

## 2020-01-15 LAB
25(OH)D3 SERPL-MCNC: 16.6 NG/ML
ALBUMIN SERPL ELPH-MCNC: 4 G/DL
ALP BLD-CCNC: 60 U/L
ALT SERPL-CCNC: 13 U/L
ANION GAP SERPL CALC-SCNC: 11 MMOL/L
APPEARANCE: CLEAR
AST SERPL-CCNC: 20 U/L
BACTERIA: NEGATIVE
BASOPHILS # BLD AUTO: 0.06 K/UL
BASOPHILS NFR BLD AUTO: 0.6 %
BILIRUB SERPL-MCNC: 0.3 MG/DL
BILIRUBIN URINE: NEGATIVE
BLOOD URINE: NEGATIVE
BUN SERPL-MCNC: 22 MG/DL
CALCIUM SERPL-MCNC: 9.6 MG/DL
CHLORIDE SERPL-SCNC: 101 MMOL/L
CHOLEST SERPL-MCNC: 240 MG/DL
CHOLEST/HDLC SERPL: 3.8 RATIO
CO2 SERPL-SCNC: 26 MMOL/L
COLOR: NORMAL
CREAT SERPL-MCNC: 1.04 MG/DL
EOSINOPHIL # BLD AUTO: 0.18 K/UL
EOSINOPHIL NFR BLD AUTO: 1.7 %
GLUCOSE QUALITATIVE U: NEGATIVE
GLUCOSE SERPL-MCNC: 98 MG/DL
HCT VFR BLD CALC: 49.4 %
HDLC SERPL-MCNC: 63 MG/DL
HGB BLD-MCNC: 15.1 G/DL
HYALINE CASTS: 1 /LPF
IMM GRANULOCYTES NFR BLD AUTO: 0.4 %
KETONES URINE: NEGATIVE
LDLC SERPL CALC-MCNC: 158 MG/DL
LEUKOCYTE ESTERASE URINE: NEGATIVE
LYMPHOCYTES # BLD AUTO: 2.88 K/UL
LYMPHOCYTES NFR BLD AUTO: 27.7 %
MAN DIFF?: NORMAL
MCHC RBC-ENTMCNC: 29 PG
MCHC RBC-ENTMCNC: 30.6 GM/DL
MCV RBC AUTO: 95 FL
MICROSCOPIC-UA: NORMAL
MONOCYTES # BLD AUTO: 0.82 K/UL
MONOCYTES NFR BLD AUTO: 7.9 %
NEUTROPHILS # BLD AUTO: 6.42 K/UL
NEUTROPHILS NFR BLD AUTO: 61.7 %
NITRITE URINE: NEGATIVE
PH URINE: 5.5
PLATELET # BLD AUTO: 327 K/UL
POTASSIUM SERPL-SCNC: 4.6 MMOL/L
PROT SERPL-MCNC: 6.7 G/DL
PROTEIN URINE: NEGATIVE
RBC # BLD: 5.2 M/UL
RBC # FLD: 13.9 %
RED BLOOD CELLS URINE: 0 /HPF
SODIUM SERPL-SCNC: 138 MMOL/L
SPECIFIC GRAVITY URINE: 1.01
SQUAMOUS EPITHELIAL CELLS: 2 /HPF
TRIGL SERPL-MCNC: 93 MG/DL
TSH SERPL-ACNC: 1.19 UIU/ML
UROBILINOGEN URINE: NORMAL
WBC # FLD AUTO: 10.4 K/UL
WHITE BLOOD CELLS URINE: 2 /HPF

## 2020-01-29 ENCOUNTER — FORM ENCOUNTER (OUTPATIENT)
Age: 70
End: 2020-01-29

## 2020-01-30 ENCOUNTER — APPOINTMENT (OUTPATIENT)
Dept: CT IMAGING | Facility: CLINIC | Age: 70
End: 2020-01-30
Payer: MEDICARE

## 2020-01-30 ENCOUNTER — OUTPATIENT (OUTPATIENT)
Dept: OUTPATIENT SERVICES | Facility: HOSPITAL | Age: 70
LOS: 1 days | End: 2020-01-30
Payer: COMMERCIAL

## 2020-01-30 VITALS — WEIGHT: 116 LBS | BODY MASS INDEX: 20.55 KG/M2 | HEIGHT: 63 IN

## 2020-01-30 DIAGNOSIS — Z13.89 ENCOUNTER FOR SCREENING FOR OTHER DISORDER: ICD-10-CM

## 2020-01-30 PROCEDURE — G0297: CPT | Mod: 26

## 2020-01-30 PROCEDURE — G0297: CPT

## 2020-01-30 NOTE — DATA REVIEWED
[Lung Cancer Screening] : Patient underwent lung cancer screening [TextBox_12] : 06/17 [2] : 2 [TextBox_27] : 01/19

## 2020-01-30 NOTE — ASSESSMENT
[Not Ready] : Patient is not ready for cessation intervention [Discussed Risks and Advised to Quit Smoking] : Discussed risks and advised to quit smoking

## 2020-01-30 NOTE — HISTORY OF PRESENT ILLNESS
[TextBox_13] : Referred by Dr. Danilo Mann.\par \par Ms. ARDON is a 70 year old female with a history of HTN, high cholesterol and emphysema.\par \par She was seen in the office today for review of eligibility for, as well as, Low-Dose CT lung cancer screening.  Reviewed and confirmed that the patient meets screening eligibility criteria:\par \par 70 years old \par \par Smoking Status: Current Smoker\par \par Number of pack(s) per day: 3/4\par Number of years smoked: 59\par Number of pack years smokin\par \par Ms. ARDON denies any symptoms of lung cancer, including new cough, change in cough, hemoptysis, and unintentional weight loss.\par \par Ms. ARDON denies any personal history of lung cancer.  No lung cancer in a first degree relative.  Denies any history of occupational exposures.

## 2020-01-30 NOTE — PLAN
[FreeTextEntry1] : - Low Dose CT chest for lung cancer screening.\par \par - Encouraged smoking cessation\par \par - Patient declined [facility] Smokers Quitline literature\par \par

## 2020-03-18 ENCOUNTER — APPOINTMENT (OUTPATIENT)
Dept: INTERNAL MEDICINE | Facility: CLINIC | Age: 70
End: 2020-03-18

## 2020-04-15 ENCOUNTER — APPOINTMENT (OUTPATIENT)
Dept: GASTROENTEROLOGY | Facility: CLINIC | Age: 70
End: 2020-04-15

## 2020-05-19 ENCOUNTER — APPOINTMENT (OUTPATIENT)
Dept: INTERNAL MEDICINE | Facility: CLINIC | Age: 70
End: 2020-05-19
Payer: MEDICARE

## 2020-05-19 VITALS
HEIGHT: 63 IN | BODY MASS INDEX: 20.55 KG/M2 | DIASTOLIC BLOOD PRESSURE: 80 MMHG | WEIGHT: 116 LBS | SYSTOLIC BLOOD PRESSURE: 180 MMHG

## 2020-05-19 PROCEDURE — 99214 OFFICE O/P EST MOD 30 MIN: CPT

## 2020-05-19 RX ORDER — METOPROLOL SUCCINATE 25 MG/1
25 TABLET, EXTENDED RELEASE ORAL
Qty: 90 | Refills: 3 | Status: DISCONTINUED | COMMUNITY
Start: 2019-08-29 | End: 2020-05-19

## 2020-05-19 NOTE — ASSESSMENT
[FreeTextEntry1] : Patient examined and adjustments to therapy for HBP is stable at the moment the does not need adjusting. Patient is quite anxious causing elevations at first reading All labs reviewed with patient as well. Review of systems and physical exam discussed with patient. Care plan for future followups discussed with patient. All issues of health for the current year discussed in depth with patient who was conversant and all relevant issues addressed.\par \par Cholesterol levels discussed with patient. Compliance with oral medication were also addressed . Ideal LDL levels were  discussed with the patient. All issues regarding the implications of high cholesterol necessity for treatment were discussed with the patient who is conversant and all relevant questions were asked and answered. 10 year risk assessment discussed with patient. It is approximately 20% due to hypertension and smoking. Patient is hesitant to take a statin and for now will have a coronary calcium score and discussed after\par \par All issues regarding patient's health and medical problems have been discussed. The patient understands and concurs with the treatment plan.

## 2020-05-19 NOTE — REVIEW OF SYSTEMS
[Negative] : Heme/Lymph [Abdominal Pain] : abdominal pain [Nausea] : nausea [Constipation] : constipation [FreeTextEntry7] : neg colon

## 2020-05-19 NOTE — PHYSICAL EXAM
[Well Nourished] : well nourished [No Acute Distress] : no acute distress [Well Developed] : well developed [Well-Appearing] : well-appearing [PERRL] : pupils equal round and reactive to light [Normal Sclera/Conjunctiva] : normal sclera/conjunctiva [Normal Outer Ear/Nose] : the outer ears and nose were normal in appearance [EOMI] : extraocular movements intact [Normal Oropharynx] : the oropharynx was normal [No JVD] : no jugular venous distention [No Lymphadenopathy] : no lymphadenopathy [Supple] : supple [Thyroid Normal, No Nodules] : the thyroid was normal and there were no nodules present [No Respiratory Distress] : no respiratory distress  [Clear to Auscultation] : lungs were clear to auscultation bilaterally [No Accessory Muscle Use] : no accessory muscle use [Regular Rhythm] : with a regular rhythm [Normal Rate] : normal rate  [Normal S1, S2] : normal S1 and S2 [No Abdominal Bruit] : a ~M bruit was not heard ~T in the abdomen [No Carotid Bruits] : no carotid bruits [No Murmur] : no murmur heard [No Varicosities] : no varicosities [Pedal Pulses Present] : the pedal pulses are present [No Palpable Aorta] : no palpable aorta [No Edema] : there was no peripheral edema [Soft] : abdomen soft [No Extremity Clubbing/Cyanosis] : no extremity clubbing/cyanosis [Non-distended] : non-distended [Non Tender] : non-tender [No HSM] : no HSM [No Masses] : no abdominal mass palpated [Normal Posterior Cervical Nodes] : no posterior cervical lymphadenopathy [Normal Bowel Sounds] : normal bowel sounds [No CVA Tenderness] : no CVA  tenderness [Normal Anterior Cervical Nodes] : no anterior cervical lymphadenopathy [No Spinal Tenderness] : no spinal tenderness [No Joint Swelling] : no joint swelling [No Rash] : no rash [Grossly Normal Strength/Tone] : grossly normal strength/tone [Coordination Grossly Intact] : coordination grossly intact [No Focal Deficits] : no focal deficits [Normal Gait] : normal gait [Normal Affect] : the affect was normal [Deep Tendon Reflexes (DTR)] : deep tendon reflexes were 2+ and symmetric [Normal Insight/Judgement] : insight and judgment were intact

## 2020-09-15 ENCOUNTER — APPOINTMENT (OUTPATIENT)
Dept: INTERNAL MEDICINE | Facility: CLINIC | Age: 70
End: 2020-09-15
Payer: MEDICARE

## 2020-09-15 VITALS
DIASTOLIC BLOOD PRESSURE: 60 MMHG | HEIGHT: 63 IN | TEMPERATURE: 97.1 F | WEIGHT: 119 LBS | BODY MASS INDEX: 21.09 KG/M2 | SYSTOLIC BLOOD PRESSURE: 145 MMHG

## 2020-09-15 PROCEDURE — 99214 OFFICE O/P EST MOD 30 MIN: CPT | Mod: 25

## 2020-09-15 PROCEDURE — 36415 COLL VENOUS BLD VENIPUNCTURE: CPT

## 2020-09-15 NOTE — PHYSICAL EXAM
[No Acute Distress] : no acute distress [Well Developed] : well developed [Well Nourished] : well nourished [Well-Appearing] : well-appearing [PERRL] : pupils equal round and reactive to light [Normal Sclera/Conjunctiva] : normal sclera/conjunctiva [EOMI] : extraocular movements intact [Normal Oropharynx] : the oropharynx was normal [Normal Outer Ear/Nose] : the outer ears and nose were normal in appearance [No JVD] : no jugular venous distention [No Lymphadenopathy] : no lymphadenopathy [Supple] : supple [No Respiratory Distress] : no respiratory distress  [Thyroid Normal, No Nodules] : the thyroid was normal and there were no nodules present [Clear to Auscultation] : lungs were clear to auscultation bilaterally [No Accessory Muscle Use] : no accessory muscle use [Normal S1, S2] : normal S1 and S2 [Regular Rhythm] : with a regular rhythm [Normal Rate] : normal rate  [No Murmur] : no murmur heard [No Carotid Bruits] : no carotid bruits [Pedal Pulses Present] : the pedal pulses are present [No Varicosities] : no varicosities [No Abdominal Bruit] : a ~M bruit was not heard ~T in the abdomen [No Edema] : there was no peripheral edema [No Extremity Clubbing/Cyanosis] : no extremity clubbing/cyanosis [No Palpable Aorta] : no palpable aorta [Soft] : abdomen soft [Non Tender] : non-tender [Non-distended] : non-distended [No Masses] : no abdominal mass palpated [No HSM] : no HSM [Normal Posterior Cervical Nodes] : no posterior cervical lymphadenopathy [Normal Bowel Sounds] : normal bowel sounds [No CVA Tenderness] : no CVA  tenderness [Normal Anterior Cervical Nodes] : no anterior cervical lymphadenopathy [No Spinal Tenderness] : no spinal tenderness [No Joint Swelling] : no joint swelling [Grossly Normal Strength/Tone] : grossly normal strength/tone [No Rash] : no rash [Coordination Grossly Intact] : coordination grossly intact [No Focal Deficits] : no focal deficits [Normal Gait] : normal gait [Deep Tendon Reflexes (DTR)] : deep tendon reflexes were 2+ and symmetric [Normal Affect] : the affect was normal [Normal Insight/Judgement] : insight and judgment were intact [de-identified] : nl memory

## 2020-09-15 NOTE — ASSESSMENT
[FreeTextEntry1] : Patient examined and adjustments to therapy for HBP [does not need adjustingAll labs reviewed with patient as well. Review of systems and physical exam discussed with patient. Care plan for future followups discussed with patient. All issues of health for the current year discussed in depth with patient who was conversant and all relevant issues addressed.\par \par Cholesterol levels discussed with patient. Compliance with oral medication were also addressed . Ideal LDL levels were  discussed with the patient. All issues regarding the implications of high cholesterol necessity for treatment were discussed with the patient who is conversant and all relevant questions were asked and answered.patient's LDL is markedly elevated with a 10 year risk of 36%. Patient had a coronary calcium score and has a strong family history of coronary artery disease. Lipitor was again recommended this visit at this time patient consents and will start10 mg a day and slowly increase. She still smokes and will not stop\par \par Patient complaining of mild memory searching issues but was 5 out of 5 with memory call at one and 5 minutes and no evidence of memory deficit\par \par All issues regarding patient's health and medical problems have been discussed. The patient understands and concurs with the treatment plan.

## 2020-09-15 NOTE — HISTORY OF PRESENT ILLNESS
[FreeTextEntry1] : Patient here in follow up to last visit and prior issue\par  [de-identified] : 10 Year risk k 20% due to smoking and HBP\par Patient in followup to several medical problems. She is tolerating her antihypertensives and blood pressure has been low at home. She has no complaints chest pain or shortness of breath she is also to discuss elevated cholesterol\par Refuses flu shot \par Patient also complaining of word searching at times without loss of memory or any other focal neurological events. She also hasn't shown full history coronary artery disease and did not have a coronary calcium

## 2020-09-15 NOTE — REVIEW OF SYSTEMS
[Abdominal Pain] : abdominal pain [Nausea] : nausea [Constipation] : constipation [Negative] : Gastrointestinal [FreeTextEntry7] : neg colon

## 2020-09-24 NOTE — ED ADULT TRIAGE NOTE - NSWEIGHTCALCTOOLDRUG_GEN_A_CORE
used Is This A New Presentation, Or A Follow-Up?: Skin Lesion What Type Of Note Output Would You Prefer (Optional)?: Standard Output How Severe Is Your Skin Lesion?: moderate Has Your Skin Lesion Been Treated?: not been treated

## 2020-11-28 ENCOUNTER — TRANSCRIPTION ENCOUNTER (OUTPATIENT)
Age: 70
End: 2020-11-28

## 2020-12-14 ENCOUNTER — APPOINTMENT (OUTPATIENT)
Dept: INTERNAL MEDICINE | Facility: CLINIC | Age: 70
End: 2020-12-14
Payer: MEDICARE

## 2020-12-14 VITALS
TEMPERATURE: 97.7 F | SYSTOLIC BLOOD PRESSURE: 150 MMHG | HEIGHT: 63 IN | WEIGHT: 120 LBS | BODY MASS INDEX: 21.26 KG/M2 | DIASTOLIC BLOOD PRESSURE: 70 MMHG

## 2020-12-14 DIAGNOSIS — M25.552 PAIN IN RIGHT HIP: ICD-10-CM

## 2020-12-14 DIAGNOSIS — M25.551 PAIN IN RIGHT HIP: ICD-10-CM

## 2020-12-14 PROCEDURE — 99215 OFFICE O/P EST HI 40 MIN: CPT

## 2020-12-14 PROCEDURE — 99072 ADDL SUPL MATRL&STAF TM PHE: CPT

## 2020-12-14 RX ORDER — ATORVASTATIN CALCIUM 10 MG/1
10 TABLET, FILM COATED ORAL DAILY
Qty: 90 | Refills: 1 | Status: COMPLETED | COMMUNITY
Start: 2020-09-15 | End: 2020-12-14

## 2020-12-14 NOTE — ASSESSMENT
[FreeTextEntry1] : This is an extended visit due to the multitude of problems the patient presented with and discussion of each problem with the patient.\par \par Cholesterol levels discussed with patient. Compliance with oral medication were also addressed . Ideal LDL levels were  discussed with the patient. All issues regarding the implications of high cholesterol necessity for treatment were discussed with the patient who is conversant and all relevant questions were asked and answered. Patient did not get coronary calcium score and has a significant elevated risk. She states she is intolerant of Lipitor. We discussed the need for treatment based on her quite elevated numbers. She did agree to change to Crestor 5 mg which she'll start at 2-1/2 a day and will get a coronary calcium score to help with decision making regarding how aggressive to low-cholesterol\par \par Patient examined and adjustments to therapy for HBP not need to be adjusted All labs reviewed with patient as well. Review of systems and physical exam discussed with patient. Care plan for future followups discussed with patient. All issues of health for the current year discussed in depth with patient who was conversant and all relevant issues addressed.\par \par Patient with bilateral hip pain with decreased range of motion and was sent for x-rays of both hips. She did complain a nondescript leg pain with normal circulation\par \par This is all discussed at great length the patient will followup in 2 months.

## 2020-12-14 NOTE — PHYSICAL EXAM
[No Acute Distress] : no acute distress [Well Nourished] : well nourished [Well Developed] : well developed [Well-Appearing] : well-appearing [Normal Sclera/Conjunctiva] : normal sclera/conjunctiva [PERRL] : pupils equal round and reactive to light [EOMI] : extraocular movements intact [Normal Outer Ear/Nose] : the outer ears and nose were normal in appearance [Normal Oropharynx] : the oropharynx was normal [No JVD] : no jugular venous distention [No Lymphadenopathy] : no lymphadenopathy [Supple] : supple [Thyroid Normal, No Nodules] : the thyroid was normal and there were no nodules present [No Respiratory Distress] : no respiratory distress  [No Accessory Muscle Use] : no accessory muscle use [Clear to Auscultation] : lungs were clear to auscultation bilaterally [Normal Rate] : normal rate  [Regular Rhythm] : with a regular rhythm [Normal S1, S2] : normal S1 and S2 [No Murmur] : no murmur heard [No Carotid Bruits] : no carotid bruits [No Abdominal Bruit] : a ~M bruit was not heard ~T in the abdomen [No Varicosities] : no varicosities [Pedal Pulses Present] : the pedal pulses are present [No Edema] : there was no peripheral edema [No Palpable Aorta] : no palpable aorta [No Extremity Clubbing/Cyanosis] : no extremity clubbing/cyanosis [Soft] : abdomen soft [Non Tender] : non-tender [Non-distended] : non-distended [No Masses] : no abdominal mass palpated [No HSM] : no HSM [Normal Bowel Sounds] : normal bowel sounds [Normal Posterior Cervical Nodes] : no posterior cervical lymphadenopathy [Normal Anterior Cervical Nodes] : no anterior cervical lymphadenopathy [No CVA Tenderness] : no CVA  tenderness [No Spinal Tenderness] : no spinal tenderness [No Joint Swelling] : no joint swelling [Grossly Normal Strength/Tone] : grossly normal strength/tone [No Rash] : no rash [Coordination Grossly Intact] : coordination grossly intact [No Focal Deficits] : no focal deficits [Normal Gait] : normal gait [Normal Affect] : the affect was normal [Normal Insight/Judgement] : insight and judgment were intact [de-identified] : Decreased range of motion in all directions bilateral hips

## 2020-12-14 NOTE — HISTORY OF PRESENT ILLNESS
[FreeTextEntry1] : Patient here for evaluation of multiple medical problems and new issues to be discussed\par  [de-identified] : Patient here with multiple complaints and a list of issues to discuss. She was intolerant of Lipitor at low dose and stopped. Her complaints were nondescript joint symptoms. She has not had her coronary calcium score.\par Patient also complaining of bilateral hip pain with ambulation and leg pain with ambulation.\par Patient also complaining of periodic headaches relieved by Advil without progression\par Patient placed on no new medications since last

## 2020-12-14 NOTE — REVIEW OF SYSTEMS
[Joint Pain] : joint pain [Joint Stiffness] : joint stiffness [Muscle Pain] : muscle pain [Back Pain] : back pain [Headache] : headache [Negative] : Heme/Lymph [FreeTextEntry4] : ha

## 2020-12-31 ENCOUNTER — TRANSCRIPTION ENCOUNTER (OUTPATIENT)
Age: 70
End: 2020-12-31

## 2021-01-18 ENCOUNTER — TRANSCRIPTION ENCOUNTER (OUTPATIENT)
Age: 71
End: 2021-01-18

## 2021-02-12 ENCOUNTER — APPOINTMENT (OUTPATIENT)
Dept: ORTHOPEDIC SURGERY | Facility: CLINIC | Age: 71
End: 2021-02-12
Payer: MEDICARE

## 2021-02-12 VITALS
SYSTOLIC BLOOD PRESSURE: 165 MMHG | WEIGHT: 121 LBS | BODY MASS INDEX: 21.44 KG/M2 | DIASTOLIC BLOOD PRESSURE: 87 MMHG | HEART RATE: 80 BPM | HEIGHT: 63 IN

## 2021-02-12 DIAGNOSIS — E78.00 PURE HYPERCHOLESTEROLEMIA, UNSPECIFIED: ICD-10-CM

## 2021-02-12 DIAGNOSIS — M25.551 PAIN IN RIGHT HIP: ICD-10-CM

## 2021-02-12 DIAGNOSIS — I10 ESSENTIAL (PRIMARY) HYPERTENSION: ICD-10-CM

## 2021-02-12 PROCEDURE — 99203 OFFICE O/P NEW LOW 30 MIN: CPT

## 2021-02-12 PROCEDURE — 99072 ADDL SUPL MATRL&STAF TM PHE: CPT

## 2021-02-12 NOTE — DISCUSSION/SUMMARY
[de-identified] : The patient is a 71 year old female with symptoms based on history that are consistent with possible lumbar spinal stenosis.  She has pain-free hip range of motion without any loss of range of motion and her trochanteric bursa is nontender bilaterally.  She was referred to physiatry for evaluation of her lumbar spine. In the meantime we discussed the use of over-the-counter anti-inflammatories as well as activity modification and ice as needed. She may follow up as needed.

## 2021-02-12 NOTE — ADDENDUM
[FreeTextEntry1] : This note was authored by Selvin Campbell working as a medical scribe for Dr. Duran Sanders. The note was reviewed, edited, and revised by Dr. Duran Sanders whom is in agreement with the exam findings, imaging findings, and treatment plan. 02/12/2021.

## 2021-02-12 NOTE — HISTORY OF PRESENT ILLNESS
[de-identified] : The patient is a 71 year old female being seen for evaluation of her right hip. She denies injury, trauma, or change of activity. She reports pain began over the last few months and is intermittent in nature. She reports occasional lumbar spine pain. She is ambulating and transferring well, but reports intermittent pain while ambulating for long distances. She reports pain is localized to the lateral and anterolateral thigh with radiation down the leg toward the foot. She denies any groin pain at this time. She reports a burning sensation. She comes in today for evaluation and for treatment options.

## 2021-02-12 NOTE — PHYSICAL EXAM
[de-identified] : The patient appears well nourished  and in no apparent distress.  The patient is alert and oriented to person, place, and time.   Affect and mood appear normal. The head is normocephalic and atraumatic.  The eyes reveal normal sclera and extra ocular muscles are intact. The tongue is midline with no apparent lesions.  Skin shows normal turgor with no evidence of eczema or psoriasis.  No respiratory distress noted.  Sensation grossly intact.\par   [de-identified] : Exam of the bilateral hips shows pain free hip ROM, nontender to palpation of the trochanteric bursa bilaterally. 5/5 motor strength bilaterally distally. Sensation intact distally.  [de-identified] : Outside Xray-  minimal if any arthritis of the bilateral hip.

## 2021-02-23 ENCOUNTER — APPOINTMENT (OUTPATIENT)
Dept: INTERNAL MEDICINE | Facility: CLINIC | Age: 71
End: 2021-02-23
Payer: MEDICARE

## 2021-02-23 VITALS
BODY MASS INDEX: 21.44 KG/M2 | DIASTOLIC BLOOD PRESSURE: 90 MMHG | SYSTOLIC BLOOD PRESSURE: 180 MMHG | TEMPERATURE: 97.8 F | HEIGHT: 63 IN | WEIGHT: 121 LBS

## 2021-02-23 PROCEDURE — 99072 ADDL SUPL MATRL&STAF TM PHE: CPT

## 2021-02-23 PROCEDURE — 99214 OFFICE O/P EST MOD 30 MIN: CPT

## 2021-02-23 NOTE — HISTORY OF PRESENT ILLNESS
[FreeTextEntry1] : Patient here for evaluation of multiple medical problems and new issues to be discussed\par  [de-identified] : Patient here with multiple complaints and a list of issues to discuss. She was intolerant of Lipitor at low dose and stopped. Her complaints were nondescript joint symptoms. She has not had her coronary calcium score.\par Patient also complaining of bilateral hip pain with ambulation and leg pain with ambulation.\par Patient also complaining of periodic headaches relieved by Advil without progression\par Patient placed on no new medications since last \par 22321\par Since last visit patient was placed on Crestor but stopped taking it due to complaints of muscle aches and pains. Severe recurrent problem with her for many years and is disappointed she does not want to take any statins in with the diet. She is fully aware of the issues surrounding not treatment of high cholesterol and it is noted that medicines were discontinued at her request\par \par Patient periodically takes her blood pressure and it is intermittently elevated. Today her blood pressure was 160/82 multiple times both lungs. I recommended increasing metoprolol 100 mg a day which the patient refused. She is aware of palpitations of untreated hypertension\par \par She will followup in 4 months. She's also referred to GI for a complaints of constipation for

## 2021-02-23 NOTE — ASSESSMENT
[FreeTextEntry1] : Patient examined and adjustments to therapy for HBP was required as blood pressure was 160/82 but patient refused increase omeprazole up to 100 mg a day. She is  potential complications of nontreatment All labs reviewed with patient as well. Review of systems and physical exam discussed with patient. Care plan for future followups discussed with patient. All issues of health for the current year discussed in depth with patient who was conversant and all relevant issues addressed.\par \par Patient with hypercholesterolemia slightly elevated coronary calcium score but refuses Crestor treatment as it causes myalgias. This is an issue with her with multiple statins at this point she refuses to take further therapy. She is aware of the issues surrounding untreated high cholesterol and fully treated hypertension but this is the case she opts for her\par \par Patient consultation further GI\par \par \par All issues regarding patient's health and medical problems have been discussed. The patient understands and concurs with the treatment plan.\par \par This was an extended visit lasting 35\par minutes, including review of prior notes laboratory data and examination and discussing with patient including completion of current note.\par \par

## 2021-06-07 ENCOUNTER — APPOINTMENT (OUTPATIENT)
Dept: GASTROENTEROLOGY | Facility: CLINIC | Age: 71
End: 2021-06-07
Payer: MEDICARE

## 2021-06-07 VITALS
SYSTOLIC BLOOD PRESSURE: 191 MMHG | DIASTOLIC BLOOD PRESSURE: 100 MMHG | WEIGHT: 123 LBS | HEART RATE: 97 BPM | HEIGHT: 63 IN | OXYGEN SATURATION: 98 % | BODY MASS INDEX: 21.79 KG/M2 | RESPIRATION RATE: 14 BRPM

## 2021-06-07 PROCEDURE — 99214 OFFICE O/P EST MOD 30 MIN: CPT

## 2021-06-07 NOTE — HISTORY OF PRESENT ILLNESS
[de-identified] : The patient is here for evaluation of epigastric pain, heartburn, constipation\par    Patient with a history of epigastric pain for the past 6 months.  It starts in the epigastric area and radiates down.  It is a sharp pain.  Is with laying down.  It occurs a few times a week.  Better with Tums.  She gets rare heartburn.  The patient does takes NSAIDs for spinal stenosis.. In the past Nexium gave her dizziness, Prilosec gave her diarrhea, Pepcid and Zantac gave her abdominal pain.  She has no dysphagia\par     Patient does have bloating and constipation.  This is chronic.  She has 1 bowel movement a day if she takes Metamucil crackers and prunes.  If she drinks prune juice she will have diarrhea.  She has no rectal bleeding.  She does get some lower abdominal cramping associated with constipation.  She has no family history of colon cancer or colon polyps.  In 2019 she had a colonoscopy which showed 4 hyperplastic polyps, diverticulosis and hemorrhoids.  The patient has associated postprandial bloating

## 2021-06-07 NOTE — PHYSICAL EXAM
[General Appearance - Alert] : alert [General Appearance - In No Acute Distress] : in no acute distress [General Appearance - Well Nourished] : well nourished [General Appearance - Well Developed] : well developed [Sclera] : the sclera and conjunctiva were normal [Neck Appearance] : the appearance of the neck was normal [Neck Cervical Mass (___cm)] : no neck mass was observed [] : no respiratory distress [Exaggerated Use Of Accessory Muscles For Inspiration] : no accessory muscle use [Respiration, Rhythm And Depth] : normal respiratory rhythm and effort [Auscultation Breath Sounds / Voice Sounds] : lungs were clear to auscultation bilaterally [Apical Impulse] : the apical impulse was normal [Heart Rate And Rhythm] : heart rate was normal and rhythm regular [Heart Sounds] : normal S1 and S2 [Bowel Sounds] : normal bowel sounds [Abdomen Tenderness] : non-tender [Abdomen Soft] : soft [Femoral Lymph Nodes Enlarged Bilaterally] : femoral [Abnormal Walk] : normal gait [Nail Clubbing] : no clubbing  or cyanosis of the fingernails [Musculoskeletal - Swelling] : no joint swelling seen [Skin Color & Pigmentation] : normal skin color and pigmentation [Oriented To Time, Place, And Person] : oriented to person, place, and time [Impaired Insight] : insight and judgment were intact [Affect] : the affect was normal

## 2021-06-07 NOTE — ASSESSMENT
[FreeTextEntry1] : A/P\par Epigastric pain, GERD, bloating \par Today's instructions for acid reflux include avoid provocative foods. For example citrus alcohol coffee chocolate mints. Smaller meals, no eating 3 hours prior to bedtime and elevate head of the bed prior to sleep.\par  \par  I discussed the risks and benefits of EGD and patient was given opportunity to ask questions. EGD to r/o Pinto's  esophagus, PUD, mass, AVM'S. Pt is medically optimized for EGD\par \par - will do bx of celiac- pt 's brother has celiac disease \par \par -Carafate 1 g twice daily as needed\par \par -Diverticulosis high-fiber diet\par -Patient had hyperplastic polyps last colonoscopy.  Given age 71 no family history of colon cancer or colon polyps, patient will require colonoscopy only if symptomatic or anemia

## 2021-06-07 NOTE — REVIEW OF SYSTEMS
[Constipation] : constipation [Heartburn] : heartburn [Negative] : Psychiatric [FreeTextEntry7] : bloating

## 2021-06-22 ENCOUNTER — APPOINTMENT (OUTPATIENT)
Dept: INTERNAL MEDICINE | Facility: CLINIC | Age: 71
End: 2021-06-22
Payer: MEDICARE

## 2021-06-22 VITALS
TEMPERATURE: 97 F | HEIGHT: 63 IN | SYSTOLIC BLOOD PRESSURE: 160 MMHG | DIASTOLIC BLOOD PRESSURE: 80 MMHG | BODY MASS INDEX: 21.79 KG/M2 | WEIGHT: 123 LBS

## 2021-06-22 PROCEDURE — 99214 OFFICE O/P EST MOD 30 MIN: CPT

## 2021-06-22 NOTE — HISTORY OF PRESENT ILLNESS
[FreeTextEntry1] : Patient here for evaluation of multiple medical problems and new issues to be discussed\par  [de-identified] : Patient here with multiple complaints and a list of issues to discuss. She was intolerant of Lipitor at low dose and stopped. Her complaints were nondescript joint symptoms. She has not had her coronary calcium score.\par Patient also complaining of bilateral hip pain with ambulation and leg pain with ambulation.\par Patient also complaining of periodic headaches relieved by Advil without progression\par Patient placed on no new medications since last \par 22321\par Since last visit patient was placed on Crestor but stopped taking it due to complaints of muscle aches and pains. Severe recurrent problem with her for many years and is disappointed she does not want to take any statins in with the diet. She is fully aware of the issues surrounding not treatment of high cholesterol and it is noted that medicines were discontinued at her request\par \par Patient periodically takes her blood pressure and it is intermittently elevated. Today her blood pressure was 160/82 multiple times both lungs. I recommended increasing metoprolol 100 mg a day which the patient refused. She is aware of palpitations of untreated hypertension\par \par [id number]\par vaccinated moderna x 1- side effect concern prevented second dose\par Patient returns for evaluation of hypertension.  She also has back and hip issues with a negative orthopedic evaluation and will see physiatry when she feels it is an issue.  There are no other issues\par

## 2021-06-22 NOTE — ASSESSMENT
[FreeTextEntry1] : Patient examined and adjustments to therapy for HBP not needed patient with significant whitecoat syndrome hypertension and after multiple readings pressure was 148/78 and is lower at home per patient's machine all labs reviewed with patient as well. Review of systems and physical exam discussed with patient. Care plan for future followups discussed with patient. All issues of health for the current year discussed in depth with patient who was conversant and all relevant issues addressed.\par Patient had endoscopy for chronic gastrointestinal symptoms\par All issues regarding patient's health and medical problems have been discussed. The patient understands and concurs with the treatment plan.\par This was an extended visit lasting 35minutes, including review of prior notes laboratory data and examination and discussing with patient including completion of current note.\par \par

## 2021-08-11 DIAGNOSIS — Z01.818 ENCOUNTER FOR OTHER PREPROCEDURAL EXAMINATION: ICD-10-CM

## 2021-08-13 ENCOUNTER — APPOINTMENT (OUTPATIENT)
Dept: DISASTER EMERGENCY | Facility: CLINIC | Age: 71
End: 2021-08-13

## 2021-08-14 LAB — SARS-COV-2 N GENE NPH QL NAA+PROBE: NOT DETECTED

## 2021-08-15 ENCOUNTER — TRANSCRIPTION ENCOUNTER (OUTPATIENT)
Age: 71
End: 2021-08-15

## 2021-08-16 ENCOUNTER — RESULT REVIEW (OUTPATIENT)
Age: 71
End: 2021-08-16

## 2021-08-16 ENCOUNTER — APPOINTMENT (OUTPATIENT)
Dept: GASTROENTEROLOGY | Facility: GI CENTER | Age: 71
End: 2021-08-16
Payer: MEDICARE

## 2021-08-16 ENCOUNTER — OUTPATIENT (OUTPATIENT)
Dept: OUTPATIENT SERVICES | Facility: HOSPITAL | Age: 71
LOS: 1 days | End: 2021-08-16
Payer: COMMERCIAL

## 2021-08-16 DIAGNOSIS — K21.9 GASTRO-ESOPHAGEAL REFLUX DISEASE WITHOUT ESOPHAGITIS: ICD-10-CM

## 2021-08-16 PROCEDURE — 43239 EGD BIOPSY SINGLE/MULTIPLE: CPT

## 2021-08-16 PROCEDURE — 88305 TISSUE EXAM BY PATHOLOGIST: CPT

## 2021-08-16 PROCEDURE — 88305 TISSUE EXAM BY PATHOLOGIST: CPT | Mod: 26

## 2021-08-16 PROCEDURE — 88342 IMHCHEM/IMCYTCHM 1ST ANTB: CPT | Mod: 26

## 2021-08-16 PROCEDURE — 88342 IMHCHEM/IMCYTCHM 1ST ANTB: CPT

## 2021-08-16 NOTE — ASSESSMENT
[FreeTextEntry1] : A/P\par gerd,epigastric pain\par \par  I discussed the risks and benefits of EGD and patient was given opportunity to ask questions. EGD to r/o Pinto's  esophagus, PUD, mass, AVM'S. Pt is medically optimized for EGD\par

## 2021-08-16 NOTE — PHYSICAL EXAM
[General Appearance - Alert] : alert [General Appearance - In No Acute Distress] : in no acute distress [General Appearance - Well Nourished] : well nourished [Sclera] : the sclera and conjunctiva were normal [] : no respiratory distress [Respiration, Rhythm And Depth] : normal respiratory rhythm and effort [Exaggerated Use Of Accessory Muscles For Inspiration] : no accessory muscle use [Auscultation Breath Sounds / Voice Sounds] : lungs were clear to auscultation bilaterally [Apical Impulse] : the apical impulse was normal [Heart Rate And Rhythm] : heart rate was normal and rhythm regular [Heart Sounds] : normal S1 and S2 [Bowel Sounds] : normal bowel sounds [Abdomen Soft] : soft [Oriented To Time, Place, And Person] : oriented to person, place, and time [Impaired Insight] : insight and judgment were intact [Affect] : the affect was normal

## 2021-08-19 LAB — SURGICAL PATHOLOGY STUDY: SIGNIFICANT CHANGE UP

## 2021-08-28 ENCOUNTER — RX RENEWAL (OUTPATIENT)
Age: 71
End: 2021-08-28

## 2021-09-23 DIAGNOSIS — M48.00 SPINAL STENOSIS, SITE UNSPECIFIED: ICD-10-CM

## 2021-10-14 NOTE — DATA REVIEWED
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  · Veronique ON  · HX of persistent atrial fibrillation, s/p GAMA/DCCV 1/3/18  · Symptomatic with diaphoresis, fatigue, dyspnea on exertion, intermittent palpitations  · Diagnosed 10/26/17, found on device check  · S/p GAMA/DCCV followed by initiation of Sotalol 1/3/18  · AF burden: 5% - longest episode 7 days on 2/6/2020, most recent on 7/14/2020 lasting 42 hours.  VR controlled during episodes - denies any symptoms   · 10/14/21:  Ongoing episode of AFL/AF, sx with fluttering palpitations  · High risk medication on Sotalol 80 mg BID  · Initiated 1/3/18  · CrCl 41 ml/min using Adj, 55 ml/min using orig (Cr 1.35)  · QT prolongation on 120 mg BID dose  · CHADSVASc 3 (HTN, age, gender)  · Anticoagulation on Warfarin   · Initiated 12/7/17   · Followed by Coumadin Clinic/Dr. Santana  · Avoid Xarelto and Eliquis due to no readily available antidote. Pradaxa not covered by insurance.   · History of GI bleed, EGD on 5/26/16 showed erosive gastritis and 2 gastric ulcers without active bleeding. EGD on 7/12/16 showed gastritis with healing of gastric ulcers.   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 Morales)  · PVCs  · Symptomatic with palpitations, worsen with exercise    · Other medical history  · Mild coronary artery disease (Cardiologist: Dr. Flores)  · Diastolic dysfunction  · Pulmonary hypertension  · History of gastric ulcers/GI bleed, f/b Dr. Baig  · Severe obstructive sleep apnea, treated with CPAP. F/b Dr. Mahajan  · ECG/Cardiac Monitoring  · ECG 10/14/21: , JT approx. 300 ms  · Cardiac Imaging  · Stress testing 3/26/2021:  No  evidence of ischemia, apical thinning noted, no evidence of prior myocardial in function, normal LVEF  · LTD Echo 9/8/20: EF 70%, moderate MR, moderate TR, RVSP 58.2 mmHg, MATT 59.9 ml/m2  · Echo 5/11/17: EF 60%, IVS 1.17, not able to view posterior wall measurements, RVSP 44 mmHg, MATT 31.2 ml/m² (IVS 1.1 on echo 12/2015)  · Right heart cath 3/27/17: moderate pulmonary hypertension  · Echo 3/8/17: EF 60-65%, grade II diastolic dysfunction, moderate TR, RVSP 70 mmHg, MATT 66.1 ml/m², LA length 5.2   · Cardiac cath 2013: Mild coronary artery disease  · Labs  · 10/11/21:  K 4.4, Cr 1.35, Mag 2.5  · 8/23/21:  K 5.2  · 1/30/19:  TSH 1.079     Recommendations:   · Use of sotalol is contraindicated if creatinine clearance is less than 40 ml per minute.  For now creatinine is stable, continue current dose of sotalol for rhythm control strategy and warfarin for anticoagulation.  No medication changes from EP standpoint.  · Remote check in 1 week.  If remains in AF/AFL will schedule for direct current cardioversion.  · Due to history of bleeding issues, discussed possibility of Watchman device in the future as alternative.     HPI:  This is a 72 year old female with multiple medical issues, seen in the clinic for follow-up of sinus node dysfunction status post Medtronic dual chamber PPM.  Also has a history of persistent atrial fibrillation, managed on sotalol 80 mg twice daily.  Is currently anticoagulated on warfarin and denies bleeding issues or concerns.  ECG today shows ventricular paced rhythm with underlying AFL, which does degenerate into atrial fibrillation at times.  Ventricular rates are fairly well controlled.    Was surprised that she is in atrial fibrillation today.  No recent triggers, symptomatic with chest fluttering.  Continues to have dyspnea on exertion with walking, especially in from the parking lot today, but no worse.  Denies shortness of breath at rest, chest pain, lightheadedness, and worsening  lower extremity swelling.  Intermittently has mild swelling of right ankle.  Blood pressure elevated at today's visit.  Currently dealing with pain in left shoulder and arm due to possible rotator cuff issue.  Has a CT scan after this appointment and is anxious about it.  Accompanied by  at visit.     PAST MEDICAL HX:    GERD (gastroesophageal reflux disease)                        VIRAJ on CPAP                                                   Cataract                                                      Arthritis                                                     Essential (primary) hypertension                                Comment: on lisinopril from Dec 2013 to June 18, 2014    Sinusitis, chronic                                            PONV (postoperative nausea and vomiting)                      Osteoarthritis                                                Rheumatoid arthritis                                            Comment: 3 surgeries for joints    Pseudophakia of both eyes                       july 2014     VIRAJ (obstructive sleep apnea)                   11/18/2015    SVT (supraventricular tachycardia) (CMS/[facility])                  History of cardiac catheterization                            Diverticulitis                                                Chronic diastolic congestive heart failure (* 4/13/2017     Hyperlipidemia, mixed                           7/1/2018      Diverticulitis of colon                                       Chronic kidney disease (CKD), stage III (moder* 7/8/2020      Diabetes mellitus (CMS/[facility])                     8/12/2020      Allergies and Medications were reviewed     PHYSICAL EXAM:  Visit Vitals  /80 (BP Location: RUE - Right upper extremity, Patient Position: Sitting, Cuff Size: Large Adult)   Pulse 79   Ht 5' 4\" (1.626 m)   Wt 96.8 kg (213 lb 6.4 oz)   SpO2 98%   BMI 36.63 kg/m²     GENERAL:  Cooperative, sitting comfortably, in no acute distress.  HEAD:  Normocephalic, Non-traumatic  CARDIOVASCULAR:   Regular rate and rhythm, normal S1/S2, 1-2/6 murmur.  RESPIRATORY:  Clear to ausculation bilaterally. No wheezes, rales or rhonchi.  EXTREMITIES: No edema LLE, right ankle edema.  NEURO: No obvious motor or sensor deficits  PSYCHIATRIC:   Alert and oriented to person, place, and time.  INTEGUMENTARY:  Warm and dry. Left pectoral incision well healed without erythema or induration.     I have personally reviewed and interpreted EKG below.  I have reviewed and summarized old records as per the impression section.     EKG: , JT approx. 300 ms     Device:   Dr. Martinez personally reviewed the device interrogation/programming while patient was in the office. Normal device function was noted.      [FreeTextEntry1] : EKG WNL\par NSR

## 2021-12-21 PROBLEM — I10 ELEVATED HEART RATE WITH ELEVATED BLOOD PRESSURE AND DIAGNOSIS OF HYPERTENSION: Status: ACTIVE | Noted: 2019-08-29

## 2021-12-22 ENCOUNTER — APPOINTMENT (OUTPATIENT)
Dept: INTERNAL MEDICINE | Facility: CLINIC | Age: 71
End: 2021-12-22
Payer: MEDICARE

## 2021-12-22 VITALS
SYSTOLIC BLOOD PRESSURE: 138 MMHG | WEIGHT: 123 LBS | BODY MASS INDEX: 21.79 KG/M2 | HEIGHT: 63 IN | DIASTOLIC BLOOD PRESSURE: 75 MMHG

## 2021-12-22 DIAGNOSIS — I10 ESSENTIAL (PRIMARY) HYPERTENSION: ICD-10-CM

## 2021-12-22 DIAGNOSIS — Z13.220 ENCOUNTER FOR SCREENING FOR LIPOID DISORDERS: ICD-10-CM

## 2021-12-22 DIAGNOSIS — R00.9 ESSENTIAL (PRIMARY) HYPERTENSION: ICD-10-CM

## 2021-12-22 DIAGNOSIS — Z13.1 ENCOUNTER FOR SCREENING FOR DIABETES MELLITUS: ICD-10-CM

## 2021-12-22 PROCEDURE — G0439: CPT

## 2021-12-22 PROCEDURE — 99213 OFFICE O/P EST LOW 20 MIN: CPT | Mod: 25

## 2021-12-22 PROCEDURE — 99406 BEHAV CHNG SMOKING 3-10 MIN: CPT

## 2021-12-22 PROCEDURE — G0296 VISIT TO DETERM LDCT ELIG: CPT

## 2021-12-22 PROCEDURE — G0444 DEPRESSION SCREEN ANNUAL: CPT

## 2021-12-22 RX ORDER — METOPROLOL SUCCINATE 50 MG/1
50 TABLET, EXTENDED RELEASE ORAL DAILY
Qty: 30 | Refills: 0 | Status: COMPLETED | COMMUNITY
End: 2021-12-22

## 2021-12-22 RX ORDER — SUCRALFATE 1 G/1
1 TABLET ORAL
Qty: 180 | Refills: 1 | Status: COMPLETED | COMMUNITY
Start: 2021-06-07 | End: 2021-12-22

## 2021-12-27 ENCOUNTER — TRANSCRIPTION ENCOUNTER (OUTPATIENT)
Age: 71
End: 2021-12-27

## 2021-12-27 LAB
25(OH)D3 SERPL-MCNC: 31.5 NG/ML
ALBUMIN SERPL ELPH-MCNC: 4.2 G/DL
ALP BLD-CCNC: 80 U/L
ALT SERPL-CCNC: 8 U/L
ANION GAP SERPL CALC-SCNC: 18 MMOL/L
APPEARANCE: CLEAR
AST SERPL-CCNC: 15 U/L
BACTERIA: NEGATIVE
BASOPHILS # BLD AUTO: 0.08 K/UL
BASOPHILS NFR BLD AUTO: 0.8 %
BILIRUB SERPL-MCNC: 0.3 MG/DL
BILIRUBIN URINE: NEGATIVE
BLOOD URINE: NEGATIVE
BUN SERPL-MCNC: 19 MG/DL
CALCIUM SERPL-MCNC: 9.5 MG/DL
CHLORIDE SERPL-SCNC: 103 MMOL/L
CHOLEST SERPL-MCNC: 242 MG/DL
CO2 SERPL-SCNC: 20 MMOL/L
COLOR: COLORLESS
CREAT SERPL-MCNC: 1.03 MG/DL
EOSINOPHIL # BLD AUTO: 0.2 K/UL
EOSINOPHIL NFR BLD AUTO: 2.1 %
ESTIMATED AVERAGE GLUCOSE: 120 MG/DL
GLUCOSE QUALITATIVE U: NEGATIVE
GLUCOSE SERPL-MCNC: 91 MG/DL
HBA1C MFR BLD HPLC: 5.8 %
HCT VFR BLD CALC: 50.3 %
HDLC SERPL-MCNC: 70 MG/DL
HGB BLD-MCNC: 15.6 G/DL
HYALINE CASTS: 0 /LPF
IMM GRANULOCYTES NFR BLD AUTO: 0.3 %
KETONES URINE: NEGATIVE
LDLC SERPL CALC-MCNC: 147 MG/DL
LEUKOCYTE ESTERASE URINE: NEGATIVE
LYMPHOCYTES # BLD AUTO: 2.49 K/UL
LYMPHOCYTES NFR BLD AUTO: 26.1 %
MAN DIFF?: NORMAL
MCHC RBC-ENTMCNC: 29.3 PG
MCHC RBC-ENTMCNC: 31 GM/DL
MCV RBC AUTO: 94.5 FL
MICROSCOPIC-UA: NORMAL
MONOCYTES # BLD AUTO: 0.82 K/UL
MONOCYTES NFR BLD AUTO: 8.6 %
NEUTROPHILS # BLD AUTO: 5.92 K/UL
NEUTROPHILS NFR BLD AUTO: 62.1 %
NITRITE URINE: NEGATIVE
NONHDLC SERPL-MCNC: 171 MG/DL
PH URINE: 5.5
PLATELET # BLD AUTO: 276 K/UL
POTASSIUM SERPL-SCNC: 4.3 MMOL/L
PROT SERPL-MCNC: 6.9 G/DL
PROTEIN URINE: NEGATIVE
RBC # BLD: 5.32 M/UL
RBC # FLD: 14.5 %
RED BLOOD CELLS URINE: 1 /HPF
SODIUM SERPL-SCNC: 141 MMOL/L
SPECIFIC GRAVITY URINE: 1.01
SQUAMOUS EPITHELIAL CELLS: 1 /HPF
TRIGL SERPL-MCNC: 123 MG/DL
TSH SERPL-ACNC: 2.3 UIU/ML
UROBILINOGEN URINE: NORMAL
WBC # FLD AUTO: 9.54 K/UL
WHITE BLOOD CELLS URINE: 1 /HPF

## 2022-01-04 ENCOUNTER — TRANSCRIPTION ENCOUNTER (OUTPATIENT)
Age: 72
End: 2022-01-04

## 2022-01-04 NOTE — HISTORY OF PRESENT ILLNESS
[FreeTextEntry1] : Annual wellness and Patient here in follow up to last visit and prior issue\par  [de-identified] : [id number]\par Patient here for wellness evaluation/discussion of medical problems and follow-up on hypertension.  She is received Covid x2 and refuses all other vaccinations including pneumonia influenza and Shingrix.  She still is an active smoker with yearly CAT scans that have been negative.  Last mammogram was 2 years ago and she is refusing one this year as well as refusing bone density.  She is up-to-date with recent colonoscopy.  She has no specific complaints today

## 2022-01-04 NOTE — COUNSELING
[Fall prevention counseling provided] : Fall prevention counseling provided [Yes] : Risk of tobacco use and health benefits of smoking cessation discussed: Yes [Cessation strategies including cessation program discussed] : Cessation strategies including cessation program discussed [Use of nicotine replacement therapies and other medications discussed] : Use of nicotine replacement therapies and other medications discussed [Encouraged to pick a quit date and identify support needed to quit] : Encouraged to pick a quit date and identify support needed to quit [No] : Not willing to quit smoking [ - Annual Lung Cancer Screening/Share Decision Making Discussion] : Annual Lung Cancer Screening/Share Decision Making Discussion. (I have advised this patient to have a Low Dose CT (LDCT) scan of the lungs and have discussed the following with the patient in a shared decision making discussion:   Benefits of Detection and Early Treatment: There is adequate evidence that annual screening for lung cancer with LDCT in a population of high-risk persons can prevent a substantial number of lung cancer–related deaths. The magnitude of benefit depends on the individual patient's risk for lung cancer, as those who are at highest risk are most likely to benefit. Screening cannot prevent most lung cancer–related deaths, and does not replace smoking cessation. Harms of Detection and Early Intervention and Treatment: The harms associated with LDCT screening include false-negative and false-positive results, incidental findings, over diagnosis, and radiation exposure. False-positive LDCT results occur in a substantial proportion of screened persons; 95% of all positive results do not lead to a diagnosis of cancer. In a high-quality screening program, further imaging can resolve most false-positive results; however, some patients may require invasive procedures. Radiation harms, including cancer resulting from cumulative exposure to radiation, vary depending on the age at the start of screening; the number of scans received; and the person's exposure to other sources of radiation, particularly other medical imaging.) [FreeTextEntry1] : 5

## 2022-01-04 NOTE — REVIEW OF SYSTEMS
[Negative] : Heme/Lymph [Shortness Of Breath] : no shortness of breath [Wheezing] : no wheezing [FreeTextEntry6] : cigs no change

## 2022-01-04 NOTE — ASSESSMENT
[FreeTextEntry1] : Wellness exam completed. All issues of health and screening up to date. Immunizations and screening reviewed with patient. All issues of health for the current year discussed in depth with patient. Patient was conversant during the exam and all pertinent issues addressed. Depression screen 0 in chart. Fall prevention was addressed.\par \par Patient examined and adjustments to therapy for HBP not needed all labs reviewed with patient as well. Review of systems and physical exam discussed with patient. Care plan for future followups discussed with patient. All issues of health for the current year discussed in depth with patient who was conversant and all relevant issues addressed.\par \par Patient with elevated cholesterol but refuses all medication.  Had a coronary calcium score mild elevation and discussed previously\par Patient an active smoker discussed with her again at length and she states she will attempt to reduce cigarettes over the next year.  She is scheduled for follow-up CT screen\par Routine labs drawn and patient follow-up in 6 months\par All issues regarding patient's health and medical problems have been discussed. The patient understands and concurs with the treatment plan.  This was an extended visit lasting 25 above wellness minutes, including review of prior notes laboratory data and examination and discussing with patient including completion of current note.\par \par \par

## 2022-01-04 NOTE — HEALTH RISK ASSESSMENT
[No] : No [Good] : ~his/her~  mood as  good [Current] : Current [20 or more] : 20 or more [Patient reported mammogram was normal] : Patient reported mammogram was normal [Patient reported colonoscopy was abnormal] : Patient reported colonoscopy was abnormal [HIV test declined] : HIV test declined [Hepatitis C test declined] : Hepatitis C test declined [None] : None [With Family] : lives with family [0] : 2) Feeling down, depressed, or hopeless: Not at all (0) [PHQ-2 Negative - No further assessment needed] : PHQ-2 Negative - No further assessment needed [YearQuit] : 40 pack years [[id number]] : 0 [LowDoseCTScan] : 12/20 [Change in mental status noted] : No change in mental status noted [MammogramDate] : 2020 [MammogramComments] : refuses [BoneDensityComments] : refuses [ColonoscopyDate] : 2019 [ColonoscopyComments] : polyps do 3-5

## 2022-01-11 ENCOUNTER — TRANSCRIPTION ENCOUNTER (OUTPATIENT)
Age: 72
End: 2022-01-11

## 2022-01-19 ENCOUNTER — NON-APPOINTMENT (OUTPATIENT)
Age: 72
End: 2022-01-19

## 2022-01-19 VITALS — HEIGHT: 63 IN | WEIGHT: 123 LBS | BODY MASS INDEX: 21.79 KG/M2

## 2022-01-19 NOTE — HISTORY OF PRESENT ILLNESS
[TextBox_13] : Referred by Dr. Danilo Mann.\par \par Ms. ARDON is a 72 year old female with a history of HTN, high cholesterol and emphysema.\par \par She was called to review eligibility for Low-Dose CT lung cancer screening.  Reviewed and confirmed that the patient meets screening eligibility criteria:\par \par 72 years old \par \par Smoking Status: Current Smoker\par \par Number of pack(s) per day: 3/4\par Number of years smoked: 61\par Number of pack years smokin\par Cut down to 1/2 ppd recently\par \par Ms. ARDON denies any symptoms of lung cancer, including new cough, change in cough, hemoptysis, and unintentional weight loss.\par \par Ms. ARDON denies any personal history of lung cancer.  No lung cancer in a first degree relative.  Denies any history of occupational exposures.

## 2022-01-27 ENCOUNTER — APPOINTMENT (OUTPATIENT)
Dept: CT IMAGING | Facility: CLINIC | Age: 72
End: 2022-01-27

## 2022-04-01 ENCOUNTER — EMERGENCY (EMERGENCY)
Facility: HOSPITAL | Age: 72
LOS: 1 days | Discharge: HOME | End: 2022-04-01
Attending: EMERGENCY MEDICINE
Payer: COMMERCIAL

## 2022-04-01 VITALS
OXYGEN SATURATION: 99 % | SYSTOLIC BLOOD PRESSURE: 184 MMHG | TEMPERATURE: 99 F | RESPIRATION RATE: 16 BRPM | HEART RATE: 72 BPM | DIASTOLIC BLOOD PRESSURE: 86 MMHG | HEIGHT: 62 IN

## 2022-04-01 VITALS
TEMPERATURE: 98 F | OXYGEN SATURATION: 95 % | DIASTOLIC BLOOD PRESSURE: 68 MMHG | SYSTOLIC BLOOD PRESSURE: 168 MMHG | RESPIRATION RATE: 18 BRPM | HEART RATE: 64 BPM

## 2022-04-01 LAB
ALBUMIN SERPL ELPH-MCNC: 3.9 G/DL — SIGNIFICANT CHANGE UP (ref 3.3–5.2)
ALP SERPL-CCNC: 68 U/L — SIGNIFICANT CHANGE UP (ref 40–120)
ALT FLD-CCNC: 12 U/L — SIGNIFICANT CHANGE UP
ANION GAP SERPL CALC-SCNC: 12 MMOL/L — SIGNIFICANT CHANGE UP (ref 5–17)
AST SERPL-CCNC: 17 U/L — SIGNIFICANT CHANGE UP
BASOPHILS # BLD AUTO: 0.09 K/UL — SIGNIFICANT CHANGE UP (ref 0–0.2)
BASOPHILS NFR BLD AUTO: 0.8 % — SIGNIFICANT CHANGE UP (ref 0–2)
BILIRUB SERPL-MCNC: 0.5 MG/DL — SIGNIFICANT CHANGE UP (ref 0.4–2)
BUN SERPL-MCNC: 22.5 MG/DL — HIGH (ref 8–20)
CALCIUM SERPL-MCNC: 9.6 MG/DL — SIGNIFICANT CHANGE UP (ref 8.6–10.2)
CHLORIDE SERPL-SCNC: 104 MMOL/L — SIGNIFICANT CHANGE UP (ref 98–107)
CO2 SERPL-SCNC: 25 MMOL/L — SIGNIFICANT CHANGE UP (ref 22–29)
CREAT SERPL-MCNC: 0.88 MG/DL — SIGNIFICANT CHANGE UP (ref 0.5–1.3)
EGFR: 69 ML/MIN/1.73M2 — SIGNIFICANT CHANGE UP
EOSINOPHIL # BLD AUTO: 0.13 K/UL — SIGNIFICANT CHANGE UP (ref 0–0.5)
EOSINOPHIL NFR BLD AUTO: 1.1 % — SIGNIFICANT CHANGE UP (ref 0–6)
GLUCOSE SERPL-MCNC: 106 MG/DL — HIGH (ref 70–99)
HCT VFR BLD CALC: 48.2 % — HIGH (ref 34.5–45)
HGB BLD-MCNC: 15.8 G/DL — HIGH (ref 11.5–15.5)
IMM GRANULOCYTES NFR BLD AUTO: 0.4 % — SIGNIFICANT CHANGE UP (ref 0–1.5)
LYMPHOCYTES # BLD AUTO: 19.1 % — SIGNIFICANT CHANGE UP (ref 13–44)
LYMPHOCYTES # BLD AUTO: 2.22 K/UL — SIGNIFICANT CHANGE UP (ref 1–3.3)
MAGNESIUM SERPL-MCNC: 2.2 MG/DL — SIGNIFICANT CHANGE UP (ref 1.6–2.6)
MCHC RBC-ENTMCNC: 30.6 PG — SIGNIFICANT CHANGE UP (ref 27–34)
MCHC RBC-ENTMCNC: 32.8 GM/DL — SIGNIFICANT CHANGE UP (ref 32–36)
MCV RBC AUTO: 93.2 FL — SIGNIFICANT CHANGE UP (ref 80–100)
MONOCYTES # BLD AUTO: 0.82 K/UL — SIGNIFICANT CHANGE UP (ref 0–0.9)
MONOCYTES NFR BLD AUTO: 7.1 % — SIGNIFICANT CHANGE UP (ref 2–14)
NEUTROPHILS # BLD AUTO: 8.32 K/UL — HIGH (ref 1.8–7.4)
NEUTROPHILS NFR BLD AUTO: 71.5 % — SIGNIFICANT CHANGE UP (ref 43–77)
PHOSPHATE SERPL-MCNC: 3.1 MG/DL — SIGNIFICANT CHANGE UP (ref 2.4–4.7)
PLATELET # BLD AUTO: 296 K/UL — SIGNIFICANT CHANGE UP (ref 150–400)
POTASSIUM SERPL-MCNC: 4.3 MMOL/L — SIGNIFICANT CHANGE UP (ref 3.5–5.3)
POTASSIUM SERPL-SCNC: 4.3 MMOL/L — SIGNIFICANT CHANGE UP (ref 3.5–5.3)
PROT SERPL-MCNC: 6.8 G/DL — SIGNIFICANT CHANGE UP (ref 6.6–8.7)
RBC # BLD: 5.17 M/UL — SIGNIFICANT CHANGE UP (ref 3.8–5.2)
RBC # FLD: 14.1 % — SIGNIFICANT CHANGE UP (ref 10.3–14.5)
SODIUM SERPL-SCNC: 141 MMOL/L — SIGNIFICANT CHANGE UP (ref 135–145)
TROPONIN T SERPL-MCNC: <0.01 NG/ML — SIGNIFICANT CHANGE UP (ref 0–0.06)
WBC # BLD: 11.63 K/UL — HIGH (ref 3.8–10.5)
WBC # FLD AUTO: 11.63 K/UL — HIGH (ref 3.8–10.5)

## 2022-04-01 PROCEDURE — 70450 CT HEAD/BRAIN W/O DYE: CPT | Mod: 26,MG

## 2022-04-01 PROCEDURE — 99285 EMERGENCY DEPT VISIT HI MDM: CPT | Mod: 25

## 2022-04-01 PROCEDURE — 72125 CT NECK SPINE W/O DYE: CPT | Mod: MG

## 2022-04-01 PROCEDURE — 84484 ASSAY OF TROPONIN QUANT: CPT

## 2022-04-01 PROCEDURE — 90715 TDAP VACCINE 7 YRS/> IM: CPT

## 2022-04-01 PROCEDURE — G1004: CPT

## 2022-04-01 PROCEDURE — 84100 ASSAY OF PHOSPHORUS: CPT

## 2022-04-01 PROCEDURE — 83735 ASSAY OF MAGNESIUM: CPT

## 2022-04-01 PROCEDURE — 12001 RPR S/N/AX/GEN/TRNK 2.5CM/<: CPT

## 2022-04-01 PROCEDURE — 36415 COLL VENOUS BLD VENIPUNCTURE: CPT

## 2022-04-01 PROCEDURE — 72125 CT NECK SPINE W/O DYE: CPT | Mod: 26,MG

## 2022-04-01 PROCEDURE — 71045 X-RAY EXAM CHEST 1 VIEW: CPT

## 2022-04-01 PROCEDURE — 82962 GLUCOSE BLOOD TEST: CPT

## 2022-04-01 PROCEDURE — 90471 IMMUNIZATION ADMIN: CPT

## 2022-04-01 PROCEDURE — 85025 COMPLETE CBC W/AUTO DIFF WBC: CPT

## 2022-04-01 PROCEDURE — 70450 CT HEAD/BRAIN W/O DYE: CPT | Mod: MG

## 2022-04-01 PROCEDURE — 80053 COMPREHEN METABOLIC PANEL: CPT

## 2022-04-01 PROCEDURE — 71045 X-RAY EXAM CHEST 1 VIEW: CPT | Mod: 26

## 2022-04-01 RX ORDER — TETANUS TOXOID, REDUCED DIPHTHERIA TOXOID AND ACELLULAR PERTUSSIS VACCINE, ADSORBED 5; 2.5; 8; 8; 2.5 [IU]/.5ML; [IU]/.5ML; UG/.5ML; UG/.5ML; UG/.5ML
0.5 SUSPENSION INTRAMUSCULAR ONCE
Refills: 0 | Status: COMPLETED | OUTPATIENT
Start: 2022-04-01 | End: 2022-04-01

## 2022-04-01 RX ADMIN — TETANUS TOXOID, REDUCED DIPHTHERIA TOXOID AND ACELLULAR PERTUSSIS VACCINE, ADSORBED 0.5 MILLILITER(S): 5; 2.5; 8; 8; 2.5 SUSPENSION INTRAMUSCULAR at 04:33

## 2022-04-01 NOTE — ED ADULT TRIAGE NOTE - CHIEF COMPLAINT QUOTE
Pt. BIBA for near syncope. Pt. states she was walking felt "wobbly" then fell. Pt. denies LOC, blood thinners. Pt. states she did hit her head. Pt. is A&Ox4. FS 90.

## 2022-04-01 NOTE — ED PROVIDER NOTE - CARE PROVIDER_API CALL
Levy Rodrigues)  Cardiovascular Disease; Internal Medicine  [address] [place] [address]  Phone: [phone]  Fax: [phone]  Follow Up Time:

## 2022-04-01 NOTE — ED PROVIDER NOTE - PHYSICAL EXAMINATION
General: well appearing, NAD  Head: NC/AT  Cardiac: RRR, no m/r/g  Respiratory: CTABL, equal chest wall expansions, no conversational dyspnea  Abdomen: soft, ND, NT  Neuro: AAOx3, coordinated movement of all 4 extremities  Psych: calm, cooperative, normal affect  Skin: warm and dry General: well appearing, NAD  Head: NC, 0.5cm scalp laceration midline parietal region, no active bleeding  Cardiac: RRR, no m/r/g  Respiratory: CTABL, equal chest wall expansions, no conversational dyspnea  Abdomen: soft, ND, NT  Neuro: AAOx3, coordinated movement of all 4 extremities  Psych: calm, cooperative, normal affect  Skin: see above for scalp

## 2022-04-01 NOTE — ED PROVIDER NOTE - PROGRESS NOTE DETAILS
Resident Gege Montesinos: cleaned scalp, abrasion, very superficial laceration visualized. no need for closure at this time. Resident Gege Montesinos: Reassessed pt, no complaints at this time. Discussed lab and imaging results. Answered all questions. Resident Gege Montesinos: cleaned scalp, abrasion, small 0.5cm laceration visualized. closed with 1 staple. Care instructions given.

## 2022-04-01 NOTE — ED PROVIDER NOTE - OBJECTIVE STATEMENT
73yo F w/pmh HTN, HLD, scoliosis presents for fall. Reports she was reaching up into a cabinet, changed her mind, as she dropped her gaze down, developed spots in her vision and dizziness, fell, hit her head on the counter. Denies LOC, reports rapid resolution to baseline. Denies lightheadedness, HA, f/ch, CP, SOB. Denies sick contacts. COVID vaccinerl 73yo F w/pmh HTN, HLD, scoliosis presents for fall. Reports she was reaching up into a cabinet, changed her mind, as she dropped her gaze down, developed spots in her vision and dizziness, fell, hit her head on the counter. Denies LOC, reports rapid resolution to baseline. Denies lightheadedness, HA, f/ch, CP, SOB. Denies sick contacts. COVID vaccinated.

## 2022-04-01 NOTE — ED PROVIDER NOTE - NS ED ATTENDING STATEMENT MOD
This was a shared visit with the DAVID. I reviewed and verified the documentation and independently performed the documented: Attending with

## 2022-04-01 NOTE — ED PROVIDER NOTE - NS ED ROS FT
HEENT: No HA, no vision changes, no facial trauma  Chest: no chest wall pain, no dyspnea  GI: No abdominal pain, no nausea, no vomiting  Neuro: No LOC, no paresthesias, no weakness  MSK: No msk pain, no swelling  Skin: +abrasions/lacerations, no ecchymosis  ROS otherwise negative except as noted in HPI

## 2022-04-01 NOTE — ED ADULT NURSE REASSESSMENT NOTE - GENERAL PATIENT STATE
Pt ambulated to bathroom with steady gait unassisted. Pt states she feels more like she is at baseline. still reports lmild lethrgy/weakness/dizziness upon standing. Orthostatics completed and negative/cooperative

## 2022-04-01 NOTE — ED PROVIDER NOTE - NSFOLLOWUPINSTRUCTIONS_ED_ALL_ED_FT
1. Follow up with your doctor.   2. Return to the ED for any new or worsening symptoms.       Fall Prevention    WHAT YOU NEED TO KNOW:    Fall prevention includes ways to make your home and other areas safer. It also includes ways you can move more carefully to prevent a fall. Health conditions that cause changes in your blood pressure, vision, or muscle strength and coordination may increase your risk for falls. Medicines may also increase your risk for falls if they make you dizzy, weak, or sleepy.    DISCHARGE INSTRUCTIONS:    Call 911 or have someone else call if:   •You have fallen and are unconscious.  •You have fallen and cannot move part of your body.      Contact your healthcare provider if:   •You have fallen and have pain or a headache.  •You have questions or concerns about your condition or care.      Fall prevention tips:   •Stand or sit up slowly. This may help you keep your balance and prevent falls.    •Use assistive devices as directed. Your healthcare provider may suggest that you use a cane or walker to help you keep your balance. You may need to have grab bars put in your bathroom near the toilet or in the shower.    •Wear shoes that fit well and have soles that . Wear shoes both inside and outside. Use slippers with good . Do not wear shoes with high heels.    •Wear a personal alarm. This is a device that allows you to call 911 if you fall and need help. Ask your healthcare provider for more information.    •Stay active. Exercise can help strengthen your muscles and improve your balance. Your healthcare provider may recommend water aerobics or walking. He or she may also recommend physical therapy to improve your coordination. Never start an exercise program without talking to your healthcare provider first.    •Manage your medical conditions.  Keep all appointments with your healthcare providers. Visit your eye doctor as directed.      Home safety tips:     Fall Prevention for Adults     •Add items to prevent falls in the bathroom. Put nonslip strips on your bath or shower floor to prevent you from slipping. Use a bath mat if you do not have carpet in the bathroom. This will prevent you from falling when you step out of the bath or shower. Use a shower seat so you do not need to stand while you shower. Sit on the toilet or a chair in your bathroom to dry yourself and put on clothing. This will prevent you from losing your balance from drying or dressing yourself while you are standing.      •Keep paths clear. Remove books, shoes, and other objects from walkways and stairs. Place cords for telephones and lamps out of the way so that you do not need to walk over them. Tape them down if you cannot move them. Remove small rugs. If you cannot remove a rug, secure it with double-sided tape. This will prevent you from tripping.      •Install bright lights in your home. Use night lights to help light paths to the bathroom or kitchen. Always turn on the light before you start walking.  •Keep items you use often on shelves within reach. Do not use a step stool to help you reach an item.  •Paint or place reflective tape on the edges of your stairs. This will help you see the stairs better.      Follow up with your doctor as directed: Write down your questions so you remember to ask them during your visits.

## 2022-04-01 NOTE — ED ADULT NURSE NOTE - OBJECTIVE STATEMENT
BIBA from home for near syncope. Pt states she got dizzy, described as room spinning and fell. Denies LOC + headstrike. Small laceration noted to posterior skull. No active bleeding noted. No neuro deficits noted. PERRLA bilaterally. All scans completed. states weakness is r/t dizziness

## 2022-04-01 NOTE — ED PROVIDER NOTE - ATTENDING CONTRIBUTION TO CARE
71yo F w/pmh HTN, HLD, scoliosis presents for fall. Reports she was reaching up into a cabinet, changed her mind, as she dropped her gaze down, developed spots in her vision and dizziness, fell, hit her head on the counter. Denies LOC, reports rapid resolution to baseline. Denies lightheadedness, HA, f/ch, CP, SOB, focal weakness or numbness. Denies sick contacts. COVID vaccinated.    In ED normal exam except for small lac to occiput. repaired with staple. cardiac exam normal. ct head neg. stable for dc with cards follow up. return precuations discussed.

## 2022-04-02 NOTE — ED POST DISCHARGE NOTE - RESULT SUMMARY
COPD noted on CXR, chart reviewed, no h/o medical illness, spoke to patient will f/u with PCP Johnathan

## 2022-04-03 ENCOUNTER — EMERGENCY (EMERGENCY)
Facility: HOSPITAL | Age: 72
LOS: 1 days | Discharge: HOME | End: 2022-04-03
Attending: EMERGENCY MEDICINE
Payer: COMMERCIAL

## 2022-04-03 VITALS
DIASTOLIC BLOOD PRESSURE: 55 MMHG | RESPIRATION RATE: 18 BRPM | HEART RATE: 67 BPM | SYSTOLIC BLOOD PRESSURE: 145 MMHG | TEMPERATURE: 98 F | OXYGEN SATURATION: 95 %

## 2022-04-03 VITALS — WEIGHT: 119.05 LBS | HEIGHT: 62 IN

## 2022-04-03 PROBLEM — E78.5 HYPERLIPIDEMIA, UNSPECIFIED: Chronic | Status: ACTIVE | Noted: 2022-04-01

## 2022-04-03 PROBLEM — I10 ESSENTIAL (PRIMARY) HYPERTENSION: Chronic | Status: ACTIVE | Noted: 2022-04-01

## 2022-04-03 LAB
ALBUMIN SERPL ELPH-MCNC: 3.7 G/DL — SIGNIFICANT CHANGE UP (ref 3.3–5.2)
ALP SERPL-CCNC: 67 U/L — SIGNIFICANT CHANGE UP (ref 40–120)
ALT FLD-CCNC: 14 U/L — SIGNIFICANT CHANGE UP
ANION GAP SERPL CALC-SCNC: 11 MMOL/L — SIGNIFICANT CHANGE UP (ref 5–17)
AST SERPL-CCNC: 32 U/L — HIGH
BASOPHILS # BLD AUTO: 0.07 K/UL — SIGNIFICANT CHANGE UP (ref 0–0.2)
BASOPHILS NFR BLD AUTO: 0.7 % — SIGNIFICANT CHANGE UP (ref 0–2)
BILIRUB SERPL-MCNC: 0.4 MG/DL — SIGNIFICANT CHANGE UP (ref 0.4–2)
BUN SERPL-MCNC: 19.1 MG/DL — SIGNIFICANT CHANGE UP (ref 8–20)
CALCIUM SERPL-MCNC: 8.9 MG/DL — SIGNIFICANT CHANGE UP (ref 8.6–10.2)
CHLORIDE SERPL-SCNC: 102 MMOL/L — SIGNIFICANT CHANGE UP (ref 98–107)
CO2 SERPL-SCNC: 24 MMOL/L — SIGNIFICANT CHANGE UP (ref 22–29)
CREAT SERPL-MCNC: 0.94 MG/DL — SIGNIFICANT CHANGE UP (ref 0.5–1.3)
EGFR: 64 ML/MIN/1.73M2 — SIGNIFICANT CHANGE UP
EOSINOPHIL # BLD AUTO: 0.19 K/UL — SIGNIFICANT CHANGE UP (ref 0–0.5)
EOSINOPHIL NFR BLD AUTO: 1.9 % — SIGNIFICANT CHANGE UP (ref 0–6)
GLUCOSE SERPL-MCNC: 120 MG/DL — HIGH (ref 70–99)
HCT VFR BLD CALC: 49.7 % — HIGH (ref 34.5–45)
HGB BLD-MCNC: 16.1 G/DL — HIGH (ref 11.5–15.5)
IMM GRANULOCYTES NFR BLD AUTO: 0.3 % — SIGNIFICANT CHANGE UP (ref 0–1.5)
LYMPHOCYTES # BLD AUTO: 1.72 K/UL — SIGNIFICANT CHANGE UP (ref 1–3.3)
LYMPHOCYTES # BLD AUTO: 17.1 % — SIGNIFICANT CHANGE UP (ref 13–44)
MAGNESIUM SERPL-MCNC: 2.2 MG/DL — SIGNIFICANT CHANGE UP (ref 1.6–2.6)
MCHC RBC-ENTMCNC: 30.1 PG — SIGNIFICANT CHANGE UP (ref 27–34)
MCHC RBC-ENTMCNC: 32.4 GM/DL — SIGNIFICANT CHANGE UP (ref 32–36)
MCV RBC AUTO: 92.9 FL — SIGNIFICANT CHANGE UP (ref 80–100)
MONOCYTES # BLD AUTO: 1.08 K/UL — HIGH (ref 0–0.9)
MONOCYTES NFR BLD AUTO: 10.8 % — SIGNIFICANT CHANGE UP (ref 2–14)
NEUTROPHILS # BLD AUTO: 6.94 K/UL — SIGNIFICANT CHANGE UP (ref 1.8–7.4)
NEUTROPHILS NFR BLD AUTO: 69.2 % — SIGNIFICANT CHANGE UP (ref 43–77)
PLATELET # BLD AUTO: 282 K/UL — SIGNIFICANT CHANGE UP (ref 150–400)
POTASSIUM SERPL-MCNC: 5 MMOL/L — SIGNIFICANT CHANGE UP (ref 3.5–5.3)
POTASSIUM SERPL-SCNC: 5 MMOL/L — SIGNIFICANT CHANGE UP (ref 3.5–5.3)
PROT SERPL-MCNC: 6.7 G/DL — SIGNIFICANT CHANGE UP (ref 6.6–8.7)
RBC # BLD: 5.35 M/UL — HIGH (ref 3.8–5.2)
RBC # FLD: 14.1 % — SIGNIFICANT CHANGE UP (ref 10.3–14.5)
SODIUM SERPL-SCNC: 137 MMOL/L — SIGNIFICANT CHANGE UP (ref 135–145)
TROPONIN T SERPL-MCNC: <0.01 NG/ML — SIGNIFICANT CHANGE UP (ref 0–0.06)
WBC # BLD: 10.03 K/UL — SIGNIFICANT CHANGE UP (ref 3.8–10.5)
WBC # FLD AUTO: 10.03 K/UL — SIGNIFICANT CHANGE UP (ref 3.8–10.5)

## 2022-04-03 PROCEDURE — 36415 COLL VENOUS BLD VENIPUNCTURE: CPT

## 2022-04-03 PROCEDURE — 84484 ASSAY OF TROPONIN QUANT: CPT

## 2022-04-03 PROCEDURE — 99285 EMERGENCY DEPT VISIT HI MDM: CPT

## 2022-04-03 PROCEDURE — 85025 COMPLETE CBC W/AUTO DIFF WBC: CPT

## 2022-04-03 PROCEDURE — 80053 COMPREHEN METABOLIC PANEL: CPT

## 2022-04-03 PROCEDURE — 71045 X-RAY EXAM CHEST 1 VIEW: CPT | Mod: 26

## 2022-04-03 PROCEDURE — 84443 ASSAY THYROID STIM HORMONE: CPT

## 2022-04-03 PROCEDURE — 99285 EMERGENCY DEPT VISIT HI MDM: CPT | Mod: 25

## 2022-04-03 PROCEDURE — 93005 ELECTROCARDIOGRAM TRACING: CPT

## 2022-04-03 PROCEDURE — 83735 ASSAY OF MAGNESIUM: CPT

## 2022-04-03 PROCEDURE — 71045 X-RAY EXAM CHEST 1 VIEW: CPT

## 2022-04-03 PROCEDURE — 93010 ELECTROCARDIOGRAM REPORT: CPT

## 2022-04-03 PROCEDURE — 96374 THER/PROPH/DIAG INJ IV PUSH: CPT

## 2022-04-03 RX ORDER — ONDANSETRON 8 MG/1
4 TABLET, FILM COATED ORAL ONCE
Refills: 0 | Status: COMPLETED | OUTPATIENT
Start: 2022-04-03 | End: 2022-04-03

## 2022-04-03 RX ORDER — PANTOPRAZOLE SODIUM 20 MG/1
40 TABLET, DELAYED RELEASE ORAL ONCE
Refills: 0 | Status: COMPLETED | OUTPATIENT
Start: 2022-04-03 | End: 2022-04-03

## 2022-04-03 RX ORDER — PANTOPRAZOLE SODIUM 20 MG/1
1 TABLET, DELAYED RELEASE ORAL
Qty: 5 | Refills: 0
Start: 2022-04-03 | End: 2022-04-07

## 2022-04-03 RX ORDER — SODIUM CHLORIDE 9 MG/ML
1000 INJECTION, SOLUTION INTRAVENOUS ONCE
Refills: 0 | Status: COMPLETED | OUTPATIENT
Start: 2022-04-03 | End: 2022-04-03

## 2022-04-03 RX ADMIN — SODIUM CHLORIDE 1000 MILLILITER(S): 9 INJECTION, SOLUTION INTRAVENOUS at 10:40

## 2022-04-03 RX ADMIN — PANTOPRAZOLE SODIUM 40 MILLIGRAM(S): 20 TABLET, DELAYED RELEASE ORAL at 11:10

## 2022-04-03 RX ADMIN — Medication 30 MILLILITER(S): at 10:44

## 2022-04-03 NOTE — ED PROVIDER NOTE - OBJECTIVE STATEMENT
73yo female with history of HTN, gerd, current smoker presenting with chest pain and palpitations since 4AM when she woke up. Also reports epigastric pain and indigestion x 2 days with nausea. Was recently in ED for syncopal episode with head injury. Denies fevers, chills, headache, shortness of breath, cough, vomiting, diarrhea, hematuria, dysuria, dark stools, focal neurologic symptoms.

## 2022-04-03 NOTE — ED PROVIDER NOTE - NSFOLLOWUPINSTRUCTIONS_ED_ALL_ED_FT
follow up with cardiology within 1 week    follow up with pulmonology within 1 week    follow up with GI within 1 week      - Please follow-up with your primary care doctor.  Please call for an appointment in the next 5-7 days but if you cannot follow-up with your primary care doctor please return to the ED for any urgent issues.  - You were given a copy of the tests performed today.  Please bring the results with you and review them with your primary care doctor.  - If you have any worsening of symptoms or any other concerns please return to the ED immediately.  - Please continue taking your home medications as directed.    Chest Pain    Chest pain can be caused by many different conditions which may or may not be dangerous. Causes include heartburn, lung infections, heart attack, blood clot in lungs, skin infections, strain or damage to muscle, cartilage, or bones, etc. In addition to a history and physical examination, an electrocardiogram (ECG) or other lab tests may have been performed to determine the cause of your chest pain. Follow up with your primary care provider or with a cardiologist as instructed.       Gastroesophageal Reflux Disease, Adult       Gastroesophageal reflux (DAVID) happens when acid from the stomach flows up into the tube that connects the mouth and the stomach (esophagus). Normally, food travels down the esophagus and stays in the stomach to be digested. With DAVID, food and stomach acid sometimes move back up into the esophagus.    You may have a disease called gastroesophageal reflux disease (GERD) if the reflux:  •Happens often.      •Causes frequent or very bad symptoms.      •Causes problems such as damage to the esophagus.      When this happens, the esophagus becomes sore and swollen. Over time, GERD can make small holes (ulcers) in the lining of the esophagus.      What are the causes?    This condition is caused by a problem with the muscle between the esophagus and the stomach. When this muscle is weak or not normal, it does not close properly to keep food and acid from coming back up from the stomach.    The muscle can be weak because of:  •Tobacco use.       •Pregnancy.       •Having a certain type of hernia (hiatal hernia).      •Alcohol use.       •Certain foods and drinks, such as coffee, chocolate, onions, and peppermint.        What increases the risk?    •Being overweight.      •Having a disease that affects your connective tissue.      •Taking NSAIDs, such a ibuprofen.        What are the signs or symptoms?    •Heartburn.       •Difficult or painful swallowing.       •The feeling of having a lump in the throat.       •A bitter taste in the mouth.       •Bad breath.       •Having a lot of saliva.      •Having an upset or bloated stomach.       •Burping.      •Chest pain. Different conditions can cause chest pain. Make sure you see your doctor if you have chest pain.      •Shortness of breath or wheezing.      •A long-term cough or a cough at night.      •Wearing away of the surface of teeth (tooth enamel).      •Weight loss.        How is this treated?    •Making changes to your diet.      •Taking medicine.      •Having surgery.      Treatment will depend on how bad your symptoms are.      Follow these instructions at home:      Eating and drinking    •Follow a diet as told by your doctor. You may need to avoid foods and drinks such as:  •Coffee and tea, with or without caffeine.      •Drinks that contain alcohol.      •Energy drinks and sports drinks.      •Bubbly (carbonated) drinks or sodas.      •Chocolate and cocoa.      •Peppermint and mint flavorings.      •Garlic and onions.      •Horseradish.      •Spicy and acidic foods. These include peppers, chili powder, fischer powder, vinegar, hot sauces, and BBQ sauce.      •Citrus fruit juices and citrus fruits, such as oranges, kat, and limes.      •Tomato-based foods. These include red sauce, chili, salsa, and pizza with red sauce.      •Fried and fatty foods. These include donuts, french fries, potato chips, and high-fat dressings.      •High-fat meats. These include hot dogs, rib eye steak, sausage, ham, and heath.      •High-fat dairy items, such as whole milk, butter, and cream cheese.        •Eat small meals often. Avoid eating large meals.      •Avoid drinking large amounts of liquid with your meals.      •Avoid eating meals during the 2–3 hours before bedtime.      •Avoid lying down right after you eat.      • Do not exercise right after you eat.        Lifestyle      • Do not smoke or use any products that contain nicotine or tobacco. If you need help quitting, ask your doctor.      •Try to lower your stress. If you need help doing this, ask your doctor.      •If you are overweight, lose an amount of weight that is healthy for you. Ask your doctor about a safe weight loss goal.      General instructions     •Pay attention to any changes in your symptoms.      •Take over-the-counter and prescription medicines only as told by your doctor.      • Do not take aspirin, ibuprofen, or other NSAIDs unless your doctor says it is okay.      •Wear loose clothes. Do not wear anything tight around your waist.      •Raise (elevate) the head of your bed about 6 inches (15 cm). You may need to use a wedge to do this.      •Avoid bending over if this makes your symptoms worse.      •Keep all follow-up visits.        Contact a doctor if:    •You have new symptoms.      •You lose weight and you do not know why.      •You have trouble swallowing or it hurts to swallow.      •You have wheezing or a cough that keeps happening.      •You have a hoarse voice.      •Your symptoms do not get better with treatment.        Get help right away if:    •You have sudden pain in your arms, neck, jaw, teeth, or back.      •You suddenly feel sweaty, dizzy, or light-headed.      •You have chest pain or shortness of breath.      •You vomit and the vomit is green, yellow, or black, or it looks like blood or coffee grounds.      •You faint.      •Your poop (stool) is red, bloody, or black.      •You cannot swallow, drink, or eat.      These symptoms may represent a serious problem that is an emergency. Do not wait to see if the symptoms will go away. Get medical help right away. Call your local emergency services (911 in the [place] Do not drive yourself to the hospital.       Summary    •If a person has gastroesophageal reflux disease (GERD), food and stomach acid move back up into the esophagus and cause symptoms or problems such as damage to the esophagus.      •Treatment will depend on how bad your symptoms are.      •Follow a diet as told by your doctor.      •Take all medicines only as told by your doctor.      This information is not intended to replace advice given to you by your health care provider. Make sure you discuss any questions you have with your health care provider.      SEEK IMMEDIATE MEDICAL CARE IF YOU HAVE ANY OF THE FOLLOWING SYMPTOMS: worsening chest pain, coughing up blood, unexplained back/neck/jaw pain, severe abdominal pain, dizziness or lightheadedness, fainting, shortness of breath, sweaty or clammy skin, vomiting, or racing heart beat. These symptoms may represent a serious problem that is an emergency. Do not wait to see if the symptoms will go away. Get medical help right away. Call 911 and do not drive yourself to the hospital.

## 2022-04-03 NOTE — ED PROVIDER NOTE - PHYSICAL EXAMINATION
General: nontoxic appearing in no acute distress. Alert and cooperative.   Head: Normocephalic, atraumatic.  Eyes: PERRLA. No conjunctival injection. No scleral icterus. EOMI  ENMT: Atraumatic external nose and ears.  Neck: Soft and supple.  Cardiac: Regular rate and regular rhythm. No murmurs. No LE edema.  Resp: Unlabored respiratory effort. Lungs CTAB. No wheezes.  Abd: Soft, non-tender, non-distended.   MSK: Spine midline and non-tender.  Skin: Warm and dry.   Neuro: AO x 3. Moves all extremities symmetrically. Motor strength and sensation grossly intact.

## 2022-04-03 NOTE — ED PROVIDER NOTE - ATTENDING CONTRIBUTION TO CARE
I personally saw the patient with the resident, and completed the key components of the history and physical exam. I then discussed the management plan with the resident.      73 y/o F with PMH HTN, GERD, smoker presents with chest discomfort that was epigastric in nature, associated with palpitations at 4 am. She was lying in  bed at the time, states that she attributed it to her GERD, but had a similar episode 2 days ago when she came to this ED, at which point she tried to stand up and fell. She notes her symptoms have since resolved, denies SOB, diaphoresis, dizziness. 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 She is trying to quit smoking. She has otherwise been well.    PE - NAD, well appearing, RRR, lungs CTA B/L, abd soft, NT/ND, no LE edema, 2+ symmetrical distal pulses.    EKG non-ischemic, patient with negative trop, (also negative trop 2 days ago), CXR with hyperinflated COPD pattern consistent with patient's smoking history. Patient feels well, agrees to start pantoprazole, recently had EGD and follows closely with Dr. Conley for GI. She also had a lung cancer screen last year, which was normal. I discussed strict return precautions and patient is comfortable with discharge, verbalizes understanding regarding indications to return.

## 2022-04-03 NOTE — ED ADULT NURSE NOTE - OBJECTIVE STATEMENT
pt to ED with complaints of being unsteady and heart racing. pt came to hospital on 4/1/22 for syncopal episode at home. and then was discharged. this morning pt got up to go to the bathroom felt palpitations and felt unsteady. pt states she has a feeling of indigestion in her chest for 2 days. pt denies SOB/ cough. pt denies any swelling in the legs. pt states the hospital had called her after she was discharged and told her the xray she had on 4/1 showed COPD. pt states she hasn't eaten much in the past few days. daughter states the pt was very pale this morning and unsteady.   pt lying in stretcher A+O x3. calm and appropriate. no LE edema noted. breath sounds clear. awaiting workup.

## 2022-04-03 NOTE — ED PROVIDER NOTE - CARE PROVIDER_API CALL
Garcia Gilbert)  Critical Care Medicine; Internal Medicine; Pulmonary Disease  [address] [facility], [facility] [address]  [place] [address]  Phone: [phone]  Fax: [phone]  Follow Up Time:     Danyelle Nunez)  Cardiology; Cardiovascular Disease; Internal Medicine  [address] [place] [address]  Phone: [phone]  Fax: [phone]  Follow Up Time:

## 2022-04-03 NOTE — ED PROVIDER NOTE - PROVIDER TOKENS
Mask fit testing  Patient passed  
PROVIDER:[TOKEN:[[id number]]],PROVIDER:[TOKEN:[[id number]]]

## 2022-04-03 NOTE — ED PROVIDER NOTE - CARE PROVIDERS DIRECT ADDRESSES
[email or web address]

## 2022-04-03 NOTE — ED PROVIDER NOTE - CLINICAL SUMMARY MEDICAL DECISION MAKING FREE TEXT BOX
71yo female with history of HTN, gerd, current smoker presenting with chest pain and palpitations since 4AM when she woke up and epigastric pain and indigestion x 2 days with nausea. RRR, lungs clear, abdomen soft, no LE edema, neurovascularly intact. Labs, cxr, meds, reassess.

## 2022-04-03 NOTE — ED PROVIDER NOTE - PROGRESS NOTE DETAILS
labs and cxr reviewed. patient asymptomatic on reassessment. VSS. discussed with patient importance to smoking cessation. meds to pharmacy. dc with cardiology and pulmonology follow up. -DO Nadia

## 2022-04-07 ENCOUNTER — TRANSCRIPTION ENCOUNTER (OUTPATIENT)
Age: 72
End: 2022-04-07

## 2022-05-06 ENCOUNTER — APPOINTMENT (OUTPATIENT)
Dept: GASTROENTEROLOGY | Facility: CLINIC | Age: 72
End: 2022-05-06
Payer: MEDICARE

## 2022-05-06 VITALS
WEIGHT: 122 LBS | HEART RATE: 72 BPM | BODY MASS INDEX: 21.62 KG/M2 | SYSTOLIC BLOOD PRESSURE: 171 MMHG | DIASTOLIC BLOOD PRESSURE: 91 MMHG | HEIGHT: 63 IN | TEMPERATURE: 97.2 F | OXYGEN SATURATION: 98 % | RESPIRATION RATE: 14 BRPM

## 2022-05-06 PROCEDURE — 99215 OFFICE O/P EST HI 40 MIN: CPT

## 2022-05-06 NOTE — ASSESSMENT
[FreeTextEntry1] : A/P\par Epigastric pain\par Today's instructions for acid reflux include avoid provocative foods. For example citrus alcohol coffee chocolate mints. Smaller meals, no eating 3 hours prior to bedtime and elevate head of the bed prior to sleep.\par \par Tums as needed.  We again discussed the use of Carafate.  Patient does not want to try this medication as she has side effects to multiple medications.  At this point Tums works for her on a short-term basis and this is what she wants to continue to use.  If patient continues to have nausea we can consider starting Zofran next visit\par \par Symptoms sound as if this is acid reflux however she continues to have pain we can consider \par \par \par Chronic constipation/diverticulosis-Metamucil, high-fiber diet\par \par Visit was 45 minutes.  I reviewed the patient's emergency room head CT images labs and notes.\par \par Follow-up in 3 months

## 2022-05-06 NOTE — HISTORY OF PRESENT ILLNESS
[de-identified] : Patient has chronic GERD for over 5 years.  She gets chronic epigastric pain heartburn.  It occurs a few times a week.  She was taking NSAIDs for her back.  Currently she is only using Tums as she has side effects to PPIs and H2 blockers.  Nexium caused dizziness, Prilosec caused diarrhea.  Pepcid and Zantac made her epigastric pain worse.  I prescribed Carafate however she did not want to try that.  EGD in 2021 was normal.  Biopsies were negative for H. pylori and celiac.\par      The patient is coming after an emergency room visit.  Patient was seen April 1 with a near syncopal episode.  The patient states she was having some heartburn and epigastric pain however the emergency room note does not reflect this.  The WBCs were 11.6 hemoglobin 16 his CT head was negative LFTs normal.  Patient again went to the emergency room on April 3.  This time it does mention epigastric pain chest pain palpitations nausea which occurred at 4 AM.  CBC was normal.  She was states she was given a "GI cocktail" and pantoprazole.  She was sent home on pantoprazole.  She states pantoprazole caused dizziness mood swings and nausea.  Patient states at this point she continues to take Tums as needed which is multiple times a day which seems to work for short periods.\par     Because of her complaints of palpitations she went to cardiologist Dr. Chua.  A EKG was normal and cardiac CTA was negative as per patient.  No further work-up was recommended\par     Patient also has chronic bloating and constipation.  She states she eats a high-fiber diet has tried Metamucil and prunes.  She has no family history of colon cancer or colon polyps.  Patient had a colonoscopy in 2019 which showed hyperplastic polyps diverticulosis and internal hemorrhoids.

## 2022-05-06 NOTE — REVIEW OF SYSTEMS
[Abdominal Pain] : abdominal pain [Constipation] : constipation [Heartburn] : heartburn [Negative] : Psychiatric

## 2022-05-06 NOTE — PHYSICAL EXAM
[General Appearance - Alert] : alert [General Appearance - In No Acute Distress] : in no acute distress [General Appearance - Well Nourished] : well nourished [General Appearance - Well Developed] : well developed [Sclera] : the sclera and conjunctiva were normal [] : no respiratory distress [Respiration, Rhythm And Depth] : normal respiratory rhythm and effort [Exaggerated Use Of Accessory Muscles For Inspiration] : no accessory muscle use [Auscultation Breath Sounds / Voice Sounds] : lungs were clear to auscultation bilaterally [Apical Impulse] : the apical impulse was normal [Heart Rate And Rhythm] : heart rate was normal and rhythm regular [Heart Sounds] : normal S1 and S2 [Bowel Sounds] : normal bowel sounds [Abdomen Soft] : soft [Abdomen Tenderness] : non-tender [Femoral Lymph Nodes Enlarged Bilaterally] : femoral [Abnormal Walk] : normal gait [Skin Color & Pigmentation] : normal skin color and pigmentation [Oriented To Time, Place, And Person] : oriented to person, place, and time [Impaired Insight] : insight and judgment were intact [Affect] : the affect was normal

## 2022-05-06 NOTE — REASON FOR VISIT
[Follow-Up: _____] : a [unfilled] follow-up visit [FreeTextEntry1] : epigastric pain, bloating, constipation

## 2022-05-31 PROBLEM — Z87.891 SMOKING HISTORY: Status: ACTIVE | Noted: 2018-11-12

## 2022-06-01 ENCOUNTER — APPOINTMENT (OUTPATIENT)
Dept: INTERNAL MEDICINE | Facility: CLINIC | Age: 72
End: 2022-06-01
Payer: MEDICARE

## 2022-06-01 VITALS
BODY MASS INDEX: 21.62 KG/M2 | HEIGHT: 63 IN | DIASTOLIC BLOOD PRESSURE: 60 MMHG | WEIGHT: 122 LBS | SYSTOLIC BLOOD PRESSURE: 140 MMHG

## 2022-06-01 DIAGNOSIS — K21.9 GASTRO-ESOPHAGEAL REFLUX DISEASE W/OUT ESOPHAGITIS: ICD-10-CM

## 2022-06-01 DIAGNOSIS — R11.0 NAUSEA: ICD-10-CM

## 2022-06-01 DIAGNOSIS — Z12.31 ENCOUNTER FOR SCREENING MAMMOGRAM FOR MALIGNANT NEOPLASM OF BREAST: ICD-10-CM

## 2022-06-01 DIAGNOSIS — Z87.891 PERSONAL HISTORY OF NICOTINE DEPENDENCE: ICD-10-CM

## 2022-06-01 PROCEDURE — 99214 OFFICE O/P EST MOD 30 MIN: CPT

## 2022-06-01 NOTE — HISTORY OF PRESENT ILLNESS
[FreeTextEntry1] : Patient here for evaluation of multiple medical problems and new issues to be discussed\par  [de-identified] : 316820\par Patient here for wellness evaluation/discussion of medical problems and follow-up on hypertension.  She is received Covid x2 and refuses all other vaccinations including pneumonia influenza and Shingrix.  She still is an active smoker with yearly CAT scans that have been negative.  Last mammogram was 2 years ago and she is refusing one this year as well as refusing bone density.  She is up-to-date with recent colonoscopy.  She has no specific complaints today\par \par [id number]\par ER visit after fall - neg HEAD CT and sutured laceration 04/01/22\par Since last visit patient had an ER visit for fall after getting light headed without true syncope or loss of consciousness.  She had negative head CAT scan normal labs and negative cardiology evaluation after except echo was not approved.  Coronary angio was unremarkable chest CAT scan unremarkable in fact patient has stopped smoking.  She is intolerant of all medications for GERD.  She has elevated cholesterol but is intolerant of medications.  She does complain of lightheadedness with standing.\par In addition patient refuses pneumococcal Shingrix and influenza vaccines\par

## 2022-06-01 NOTE — ASSESSMENT
[FreeTextEntry1] : Patient examined and adjustments to therapy for HBP no change.  Patient has very labile hypertension was elevated when I took it normal with cardiology and on admission here.  Will not give any further medication as she does not tolerate it and refuses all labs reviewed with patient as well. Review of systems and physical exam discussed with patient. Care plan for future followups discussed with patient. All issues of health for the current year discussed in depth with patient who was conversant and all relevant issues addressed.\par \par Patient with elevated cholesterol but unremarkable verbal coronary angio and refuse any treatment\par \par Patient stopped smoking and doing well\par \par Still refusing all vaccines.  Insert care summary

## 2022-06-01 NOTE — REVIEW OF SYSTEMS
[Negative] : Heme/Lymph [Shortness Of Breath] : no shortness of breath [Wheezing] : no wheezing [FreeTextEntry6] : d/c cigs

## 2022-06-02 NOTE — ED ADULT NURSE NOTE - BEFAST SPEECH
Referred by: Pooja Blackman MD; Medical Diagnosis (from order):    Diagnosis Information      Diagnosis    715.16 (ICD-9-CM) - M17.11 (ICD-10-CM) - Primary osteoarthritis of right knee                Daily Treatment Note    Visit:  3   Diagnosis Precautions: none    SUBJECTIVE                                                                                                               Right knee has been feeling good. Kept tape on for a week because it felt it was helping. Using the orthotic in right shoe now. Still gets pain along medial knee with walking but not as intense. Andare - tried three shoes but would need to go up half a size with orthotic. Today didn't have pain with single leg stance. Pain / Symptoms:  Pain/symptom is: intermittent  Pain rating (out of 10): Current: 1 ; Best: 0; Worst: 5  Location: Right medial knee    OBJECTIVE                                                                                                                       Palpation:   Comments / Details: Mildly tender right medial pes anserine>medial joint line    Special Tests:  Comments / Details:   Vertical Compression Test:  neg for nerve root impingement; inefficient response with increased lumbar extension; fair core response  Elbow flexion test: inefficient response with increased trunk flexion-fair core respone  Lumbar protective mechanism: inefficient response; fair core activation        TREATMENT                                                                                                                  Therapeutic Exercise:   Added: Phase I Core initiation in hooklying position  -pelvic floor contraction 10 sec hold  -transverse abdominus contraction 10 sec hold  -multifidus contraction 10 sec hold  -all 3 contractions 10 sec hold      Therapeutic Activity:  Deferred taping right foot for arch support    Re[facility] as patient reports good support; patient has low arch, therefore needs arch in athletic shoes maybe enough for her. However, recommendation with orthotic in dress shoes without arch support. Education with core anatomy and core exercise dynamics    Added: Transfer training with good body mechanics - sit to/from standing - corrected 75% at 3 reps. Moderate verbal and tactile cueing given to isolate targeted muscle groups, initiate and maintain appropriate exercise mechanics and to reduce and correct compensatory strategies. Skilled input: verbal instruction/cues and as detailed above    Writer verbally educated and received verbal consent for hand placement, positioning of patient, and techniques to be performed today from patient for clothing adjustments for techniques, therapist position for techniques and hand placement and palpation for techniques as described above and how they are pertinent to the patient's plan of care. Home Exercise Program/Education Materials: Access Code: [email or web address]  Access Code: [email or web address]  URL: [email or web address]  Date: 05/20/2022  Prepared by: Esther Crump    Exercises  Â· supine hip adduction with pillow - 2 x daily - 7 x weekly - 1 sets - 10 reps  Â· Straight Leg Raise with External Rotation - 2 x daily - 7 x weekly - 1 sets - 10 reps  Â· Knee straightening with leg lift - 2 x daily - 7 x weekly - 1 sets - 10 reps  Â· Sidelying Clamshell - 2 x daily - 7 x weekly - 1 sets - 10 reps  Â· Sidelying Hip Extension in Abduction - 2 x daily - 7 x weekly - 1 sets - 10 reps  Â· Bridge with towel roll between knees - 2 x daily - 7 x weekly - 1 sets - 10 reps - 2 hold  Â· Standing Hip Hiking - 2 x daily - 7 x weekly - 1 sets - 10 reps  Â· Standing Single Leg Stance with Counter Support - 2 x daily - 7 x weekly - 1 sets - 3 reps - 10 hold          ASSESSMENT                                                                                                             - Overall decreased medial knee pain with walking.  - Good response reported to taping involving right arch. Patient tried on athletic shoes(Andare) with good support; will probably not need orthotic with good support in shoe. May need orthotic in dress shoes without support. - Transfer training instruction initiated to attain and maintain good posture when transferring from sit to/from standing, sit to/from sidelying, and sidelying to/from hooklying in order to minimize pain and decrease compensatory strategies. Decreasing verbal and tactile cueing given to reinforce proper posture and decrease compensatory strategies. - Initiated core strengthening today. Poor contraction reported with pelvic floor contraction; decreased quality of contractions noted with increasing reps - after 6th, 7th-8th. Fair contraction reported with transverse abdominus contraction; improved quality contractions noted with increasing reps. Good contraction reported with multifidus contraction; good quality contractions noted with increasing reps. Moderate difficulty holding pelvic floor with all 3 contractions. Pain/symptoms after session (out of 10): 0  Patient Education:   Results of above outlined education: Verbalizes understanding, Demonstrates understanding and Needs reinforcement      PLAN                                                                                                                           Suggestions for next session as indicated: Continue 1x/wk per patient request. Progress per plan of care; progress lower extremity strengthening especially involving the Vastus Medialis Oblique muscle and posterior hip musculature. Review lower extremity strengthening exercises given previously and progress as tolerated. Progress balance and gait challenges.           Therapy procedure time and total treatment time can be found documented on the Time Entry flowsheet Yes

## 2022-08-30 ENCOUNTER — RX RENEWAL (OUTPATIENT)
Age: 72
End: 2022-08-30

## 2022-09-12 ENCOUNTER — APPOINTMENT (OUTPATIENT)
Dept: GASTROENTEROLOGY | Facility: CLINIC | Age: 72
End: 2022-09-12

## 2022-11-27 ENCOUNTER — RX RENEWAL (OUTPATIENT)
Age: 72
End: 2022-11-27

## 2022-12-10 ENCOUNTER — NON-APPOINTMENT (OUTPATIENT)
Age: 72
End: 2022-12-10

## 2022-12-26 PROBLEM — Z00.01 ANNUAL VISIT FOR GENERAL ADULT MEDICAL EXAMINATION WITH ABNORMAL FINDINGS: Status: ACTIVE | Noted: 2018-11-12

## 2022-12-28 ENCOUNTER — APPOINTMENT (OUTPATIENT)
Dept: INTERNAL MEDICINE | Facility: CLINIC | Age: 72
End: 2022-12-28

## 2022-12-28 VITALS
BODY MASS INDEX: 21.97 KG/M2 | DIASTOLIC BLOOD PRESSURE: 80 MMHG | SYSTOLIC BLOOD PRESSURE: 160 MMHG | HEIGHT: 63 IN | WEIGHT: 124 LBS

## 2022-12-28 DIAGNOSIS — F17.210 NICOTINE DEPENDENCE, CIGARETTES, UNCOMPLICATED: ICD-10-CM

## 2022-12-28 DIAGNOSIS — Z13.89 ENCOUNTER FOR SCREENING FOR OTHER DISORDER: ICD-10-CM

## 2022-12-28 DIAGNOSIS — Z13.31 ENCOUNTER FOR SCREENING FOR DEPRESSION: ICD-10-CM

## 2022-12-28 DIAGNOSIS — Z00.01 ENCOUNTER FOR GENERAL ADULT MEDICAL EXAMINATION WITH ABNORMAL FINDINGS: ICD-10-CM

## 2022-12-28 DIAGNOSIS — Z12.11 ENCOUNTER FOR SCREENING FOR MALIGNANT NEOPLASM OF COLON: ICD-10-CM

## 2022-12-28 PROCEDURE — 36415 COLL VENOUS BLD VENIPUNCTURE: CPT

## 2022-12-28 PROCEDURE — G0444 DEPRESSION SCREEN ANNUAL: CPT | Mod: 59

## 2022-12-28 PROCEDURE — G0439: CPT

## 2022-12-28 NOTE — REVIEW OF SYSTEMS
[Nausea] : nausea [Dizziness] : dizziness [Unsteady Walking] : ataxia [Negative] : Heme/Lymph [de-identified] : ent eval - dizziness all eval negative except SNL - MRI all eval neg

## 2022-12-28 NOTE — HISTORY OF PRESENT ILLNESS
[FreeTextEntry1] : Annual wellness and Patient here in follow up to last visit and prior issue\par  [de-identified] : [id number]\par Patient here for wellness evaluation/discussion of medical problems and follow-up on hypertension.  She is received Covid x2 and refuses all other vaccinations including pneumonia influenza and Shingrix.  She still is an active smoker with yearly CAT scans that have been negative.  Last mammogram was 2 years ago and she is refusing one this year as well as refusing bone density.  She is up-to-date with recent colonoscopy.  She has no specific complaints today\par \par [id number]\par ER visit after fall - neg HEAD CT and sutured laceration 04/01/22\par Since last visit patient had an ER visit for fall after getting light headed without true syncope or loss of consciousness.  She had negative head CAT scan normal labs and negative cardiology evaluation after except echo was not approved.  Coronary angio was unremarkable chest CAT scan unremarkable in fact patient has stopped smoking.  She is intolerant of all medications for GERD.  She has elevated cholesterol but is intolerant of medications.  She does complain of lightheadedness with standing.\par In addition patient refuses pneumococcal Shingrix and influenza vaccines\par \par [id number]\par Patient here for wellness and evaluation of hypertension.  Patient developed some nonspecific dizziness or ear nose and throat in the past year had full evaluation that was normal including MRI and is referred for balance therapy.  She has no spontaneous falls just feels occasionally dizzy with ambulation has no symptoms of neuropathy.  Patient up-to-date with mammograms cancer screening and maintains off cigarettes for the past 8 months.  She is compliant with antihypertensives she is intolerant of lipid-lowering agents and otherwise offers no specific complaints\par

## 2022-12-28 NOTE — HEALTH RISK ASSESSMENT
[Good] : ~his/her~  mood as  good [0] : 2) Feeling down, depressed, or hopeless: Not at all (0) [PHQ-2 Negative - No further assessment needed] : PHQ-2 Negative - No further assessment needed [Former] : Former [20 or more] : 20 or more [No] : No [No falls in past year] : Patient reported no falls in the past year [Patient reported mammogram was normal] : Patient reported mammogram was normal [Patient declined bone density test] : Patient declined bone density test [Patient reported colonoscopy was abnormal] : Patient reported colonoscopy was abnormal [HIV test declined] : HIV test declined [Hepatitis C test declined] : Hepatitis C test declined [de-identified] : 8 months [YearQuit] : 2022 [[id number]] : 0 [MammogramDate] : 06/06/2022 [ColonoscopyDate] : 03/13/2019 [ColonoscopyComments] : polyps - 2024

## 2022-12-29 ENCOUNTER — TRANSCRIPTION ENCOUNTER (OUTPATIENT)
Age: 72
End: 2022-12-29

## 2022-12-29 LAB
ALBUMIN SERPL ELPH-MCNC: 4 G/DL
ALP BLD-CCNC: 70 U/L
ALT SERPL-CCNC: 15 U/L
ANION GAP SERPL CALC-SCNC: 13 MMOL/L
AST SERPL-CCNC: 27 U/L
BASOPHILS # BLD AUTO: 0.04 K/UL
BASOPHILS NFR BLD AUTO: 0.6 %
BILIRUB SERPL-MCNC: 0.2 MG/DL
BUN SERPL-MCNC: 16 MG/DL
CALCIUM SERPL-MCNC: 10.7 MG/DL
CHLORIDE SERPL-SCNC: 102 MMOL/L
CHOLEST SERPL-MCNC: 210 MG/DL
CO2 SERPL-SCNC: 28 MMOL/L
CREAT SERPL-MCNC: 1.03 MG/DL
EGFR: 57 ML/MIN/1.73M2
EOSINOPHIL # BLD AUTO: 0.07 K/UL
EOSINOPHIL NFR BLD AUTO: 1 %
GLUCOSE SERPL-MCNC: 107 MG/DL
HCT VFR BLD CALC: 47.6 %
HDLC SERPL-MCNC: 58 MG/DL
HGB BLD-MCNC: 15 G/DL
IMM GRANULOCYTES NFR BLD AUTO: 0.1 %
LDLC SERPL CALC-MCNC: 118 MG/DL
LYMPHOCYTES # BLD AUTO: 1.8 K/UL
LYMPHOCYTES NFR BLD AUTO: 24.8 %
MAN DIFF?: NORMAL
MCHC RBC-ENTMCNC: 30.5 PG
MCHC RBC-ENTMCNC: 31.5 GM/DL
MCV RBC AUTO: 96.9 FL
MONOCYTES # BLD AUTO: 0.7 K/UL
MONOCYTES NFR BLD AUTO: 9.7 %
NEUTROPHILS # BLD AUTO: 4.63 K/UL
NEUTROPHILS NFR BLD AUTO: 63.8 %
NONHDLC SERPL-MCNC: 153 MG/DL
PLATELET # BLD AUTO: 267 K/UL
POTASSIUM SERPL-SCNC: 4.8 MMOL/L
PROT SERPL-MCNC: 7.1 G/DL
RBC # BLD: 4.91 M/UL
RBC # FLD: 14.5 %
SODIUM SERPL-SCNC: 142 MMOL/L
TRIGL SERPL-MCNC: 171 MG/DL
TSH SERPL-ACNC: 1.2 UIU/ML
WBC # FLD AUTO: 7.25 K/UL

## 2023-02-20 NOTE — ASSESSMENT
[FreeTextEntry1] : Wellness exam completed. All issues of health and screening up to date. Immunizations and screening reviewed with patient. All issues of health for the current year discussed in depth with patient. Patient was conversant during the exam and all pertinent issues addressed. Depression screen 0 in chart. Fall prevention was addressed\par \par Patient examined and adjustments to therapy for HBP not needed all labs reviewed with patient as well. Review of systems and physical exam discussed with patient. Care plan for future followups discussed with patient. All issues of health for the current year discussed in depth with patient who was conversant and all relevant issues addressed..\par \par Patient with elevated cholesterol with elevated cardiac risk but is intolerant of prior treatments and further treatment is deferred.  Patient up-to-date with mammography refuses bone density and refuses all immunizations.  She states she will get COVID booster.  This is been discussed many times over many years and patient refuses\par Routine labs drawn.  Insert care summary follow-up 6 months Xelleiz Pregnancy And Lactation Text: This medication is Pregnancy Category D and is not considered safe during pregnancy.  The risk during breast feeding is also uncertain.

## 2023-05-01 ENCOUNTER — TRANSCRIPTION ENCOUNTER (OUTPATIENT)
Age: 73
End: 2023-05-01

## 2023-06-20 ENCOUNTER — APPOINTMENT (OUTPATIENT)
Dept: INTERNAL MEDICINE | Facility: CLINIC | Age: 73
End: 2023-06-20
Payer: MEDICARE

## 2023-06-20 VITALS
DIASTOLIC BLOOD PRESSURE: 80 MMHG | SYSTOLIC BLOOD PRESSURE: 150 MMHG | BODY MASS INDEX: 21.79 KG/M2 | HEIGHT: 63 IN | WEIGHT: 123 LBS

## 2023-06-20 DIAGNOSIS — Z12.39 ENCOUNTER FOR OTHER SCREENING FOR MALIGNANT NEOPLASM OF BREAST: ICD-10-CM

## 2023-06-20 PROCEDURE — 99214 OFFICE O/P EST MOD 30 MIN: CPT | Mod: 25

## 2023-06-20 PROCEDURE — 36415 COLL VENOUS BLD VENIPUNCTURE: CPT

## 2023-06-20 NOTE — REVIEW OF SYSTEMS
[Dizziness] : dizziness [Unsteady Walking] : ataxia [Negative] : Heme/Lymph [Nausea] : nausea [de-identified] : ent eval - dizziness all eval negative except SNL - MRI all eval neg

## 2023-06-20 NOTE — ASSESSMENT
[FreeTextEntry1] : Patient examined and adjustments to therapy for HBP not needed all labs reviewed with patient as well. Review of systems and physical exam discussed with patient. Care plan for future followups discussed with patient. All issues of health for the current year discussed in depth with patient who was conversant and all relevant issues addressed.\par Patient with elevated cholesterol with low coronary calcium score and has been intolerant of all statins and nonstatin's and will defer further approach she is fully aware of the\par Patient with history of colonic polyps family history of colon cancer with last colonoscopy 2019.  She will need follow-up 2024\par Patient sent for CT chest we will follow-up 6 months wellness.\par \par Patient refuses all vaccinations.  All issues regarding patient's health and medical problems have been discussed. The patient understands and concurs with the treatment plan.

## 2023-06-20 NOTE — HISTORY OF PRESENT ILLNESS
[FreeTextEntry1] : Patient here for evaluation of multiple medical problems and new issues to be discussed\par  [de-identified] : [id number]\par Patient here for wellness evaluation/discussion of medical problems and follow-up on hypertension.  She is received Covid x2 and refuses all other vaccinations including pneumonia influenza and Shingrix.  She still is an active smoker with yearly CAT scans that have been negative.  Last mammogram was 2 years ago and she is refusing one this year as well as refusing bone density.  She is up-to-date with recent colonoscopy.  She has no specific complaints today\par \par [id number]\par ER visit after fall - neg HEAD CT and sutured laceration 04/01/22\par Since last visit patient had an ER visit for fall after getting light headed without true syncope or loss of consciousness.  She had negative head CAT scan normal labs and negative cardiology evaluation after except echo was not approved.  Coronary angio was unremarkable chest CAT scan unremarkable in fact patient has stopped smoking.  She is intolerant of all medications for GERD.  She has elevated cholesterol but is intolerant of medications.  She does complain of lightheadedness with standing.\par In addition patient refuses pneumococcal Shingrix and influenza vaccines\par \par 128013\par Patient here for wellness and evaluation of hypertension.  Patient developed some nonspecific dizziness or ear nose and throat in the past year had full evaluation that was normal including MRI and is referred for balance therapy.  She has no spontaneous falls just feels occasionally dizzy with ambulation has no symptoms of neuropathy.  Patient up-to-date with mammograms cancer screening and maintains off cigarettes for the past 8 months.  She is compliant with antihypertensives she is intolerant of lipid-lowering agents and otherwise offers no specific complaints\par \par [id number]\par Patient here for evaluation of multiple medical problems and new issues to be discussed\par neg yearly ct (lung and cardiac ). Last year age 73\par off cigs x 15 months\par colon 2019- f/up 2023-4\par Patient with high cholesterol with adverse reactions and intolerant of all statins and alternative meds and has been discussed with cardiology here will not attempt other medications\par Patient with more than 30 pack years of smoking with negative prior CAD scanning and will have her last one at age 73\par She is otherwise asymptomatic has chronic GERD symptoms but intolerant of medications and occasionally takes Advil\par \par

## 2023-06-21 LAB
ANION GAP SERPL CALC-SCNC: 16 MMOL/L
BUN SERPL-MCNC: 17 MG/DL
CALCIUM SERPL-MCNC: 10.1 MG/DL
CHLORIDE SERPL-SCNC: 103 MMOL/L
CO2 SERPL-SCNC: 24 MMOL/L
CREAT SERPL-MCNC: 1.13 MG/DL
EGFR: 51 ML/MIN/1.73M2
GLUCOSE SERPL-MCNC: 88 MG/DL
POTASSIUM SERPL-SCNC: 4.5 MMOL/L
SODIUM SERPL-SCNC: 142 MMOL/L

## 2023-08-27 ENCOUNTER — NON-APPOINTMENT (OUTPATIENT)
Age: 73
End: 2023-08-27

## 2023-11-02 RX ORDER — METOPROLOL SUCCINATE 100 MG/1
100 TABLET, EXTENDED RELEASE ORAL
Qty: 90 | Refills: 3 | Status: ACTIVE | COMMUNITY
Start: 2021-08-28 | End: 1900-01-01

## 2023-12-04 PROBLEM — I10 BENIGN ESSENTIAL HYPERTENSION: Status: ACTIVE | Noted: 2019-08-29

## 2023-12-04 PROBLEM — E78.00 HIGH BLOOD CHOLESTEROL: Status: ACTIVE | Noted: 2020-09-14

## 2023-12-05 ENCOUNTER — APPOINTMENT (OUTPATIENT)
Dept: INTERNAL MEDICINE | Facility: CLINIC | Age: 73
End: 2023-12-05
Payer: MEDICARE

## 2023-12-05 VITALS
HEIGHT: 63 IN | DIASTOLIC BLOOD PRESSURE: 80 MMHG | SYSTOLIC BLOOD PRESSURE: 190 MMHG | WEIGHT: 121 LBS | BODY MASS INDEX: 21.44 KG/M2

## 2023-12-05 DIAGNOSIS — K21.9 GASTRO-ESOPHAGEAL REFLUX DISEASE W/OUT ESOPHAGITIS: ICD-10-CM

## 2023-12-05 DIAGNOSIS — Z12.2 ENCOUNTER FOR SCREENING FOR MALIGNANT NEOPLASM OF RESPIRATORY ORGANS: ICD-10-CM

## 2023-12-05 DIAGNOSIS — I10 ESSENTIAL (PRIMARY) HYPERTENSION: ICD-10-CM

## 2023-12-05 DIAGNOSIS — E78.00 PURE HYPERCHOLESTEROLEMIA, UNSPECIFIED: ICD-10-CM

## 2023-12-05 PROCEDURE — 36415 COLL VENOUS BLD VENIPUNCTURE: CPT

## 2023-12-05 PROCEDURE — 99214 OFFICE O/P EST MOD 30 MIN: CPT | Mod: 25

## 2023-12-05 PROCEDURE — 99204 OFFICE O/P NEW MOD 45 MIN: CPT | Mod: 25

## 2023-12-06 LAB
ALBUMIN SERPL ELPH-MCNC: 4.2 G/DL
ALP BLD-CCNC: 68 U/L
ALT SERPL-CCNC: 9 U/L
ANION GAP SERPL CALC-SCNC: 10 MMOL/L
AST SERPL-CCNC: 21 U/L
BILIRUB SERPL-MCNC: 0.4 MG/DL
BUN SERPL-MCNC: 20 MG/DL
CALCIUM SERPL-MCNC: 10.3 MG/DL
CHLORIDE SERPL-SCNC: 103 MMOL/L
CO2 SERPL-SCNC: 28 MMOL/L
CREAT SERPL-MCNC: 1.06 MG/DL
EGFR: 55 ML/MIN/1.73M2
FOLATE SERPL-MCNC: 2.3 NG/ML
GLUCOSE SERPL-MCNC: 103 MG/DL
POTASSIUM SERPL-SCNC: 4.7 MMOL/L
PROT SERPL-MCNC: 6.9 G/DL
SODIUM SERPL-SCNC: 140 MMOL/L
TSH SERPL-ACNC: 1.42 UIU/ML
VIT B12 SERPL-MCNC: 344 PG/ML

## 2024-01-06 ENCOUNTER — NON-APPOINTMENT (OUTPATIENT)
Age: 74
End: 2024-01-06

## 2024-06-29 ENCOUNTER — NON-APPOINTMENT (OUTPATIENT)
Age: 74
End: 2024-06-29

## 2024-09-10 ENCOUNTER — RX RENEWAL (OUTPATIENT)
Age: 74
End: 2024-09-10

## 2024-09-26 ENCOUNTER — NON-APPOINTMENT (OUTPATIENT)
Age: 74
End: 2024-09-26

## 2025-03-29 ENCOUNTER — NON-APPOINTMENT (OUTPATIENT)
Age: 75
End: 2025-03-29

## 2025-04-02 ENCOUNTER — EMERGENCY (EMERGENCY)
Facility: HOSPITAL | Age: 75
LOS: 1 days | End: 2025-04-02
Attending: EMERGENCY MEDICINE
Payer: MEDICARE

## 2025-04-02 VITALS
WEIGHT: 123.68 LBS | TEMPERATURE: 98 F | SYSTOLIC BLOOD PRESSURE: 153 MMHG | DIASTOLIC BLOOD PRESSURE: 74 MMHG | RESPIRATION RATE: 18 BRPM | OXYGEN SATURATION: 96 % | HEART RATE: 82 BPM | HEIGHT: 62 IN

## 2025-04-02 VITALS
DIASTOLIC BLOOD PRESSURE: 72 MMHG | RESPIRATION RATE: 18 BRPM | TEMPERATURE: 98 F | OXYGEN SATURATION: 99 % | SYSTOLIC BLOOD PRESSURE: 151 MMHG | HEART RATE: 76 BPM

## 2025-04-02 LAB
ALBUMIN SERPL ELPH-MCNC: 3.7 G/DL — SIGNIFICANT CHANGE UP (ref 3.3–5.2)
ALP SERPL-CCNC: 95 U/L — SIGNIFICANT CHANGE UP (ref 40–120)
ALT FLD-CCNC: 20 U/L — SIGNIFICANT CHANGE UP
ANION GAP SERPL CALC-SCNC: 15 MMOL/L — SIGNIFICANT CHANGE UP (ref 5–17)
AST SERPL-CCNC: 27 U/L — SIGNIFICANT CHANGE UP
BASOPHILS # BLD AUTO: 0.05 K/UL — SIGNIFICANT CHANGE UP (ref 0–0.2)
BASOPHILS NFR BLD AUTO: 0.6 % — SIGNIFICANT CHANGE UP (ref 0–2)
BILIRUB SERPL-MCNC: 0.4 MG/DL — SIGNIFICANT CHANGE UP (ref 0.4–2)
BLD GP AB SCN SERPL QL: SIGNIFICANT CHANGE UP
BUN SERPL-MCNC: 17.1 MG/DL — SIGNIFICANT CHANGE UP (ref 8–20)
CALCIUM SERPL-MCNC: 9.6 MG/DL — SIGNIFICANT CHANGE UP (ref 8.4–10.5)
CHLORIDE SERPL-SCNC: 101 MMOL/L — SIGNIFICANT CHANGE UP (ref 96–108)
CO2 SERPL-SCNC: 23 MMOL/L — SIGNIFICANT CHANGE UP (ref 22–29)
CREAT SERPL-MCNC: 1.06 MG/DL — SIGNIFICANT CHANGE UP (ref 0.5–1.3)
EGFR: 54 ML/MIN/1.73M2 — LOW
EGFR: 54 ML/MIN/1.73M2 — LOW
EOSINOPHIL # BLD AUTO: 0.06 K/UL — SIGNIFICANT CHANGE UP (ref 0–0.5)
EOSINOPHIL NFR BLD AUTO: 0.7 % — SIGNIFICANT CHANGE UP (ref 0–6)
GLUCOSE SERPL-MCNC: 93 MG/DL — SIGNIFICANT CHANGE UP (ref 70–99)
HCT VFR BLD CALC: 47.7 % — HIGH (ref 34.5–45)
HGB BLD-MCNC: 15.2 G/DL — SIGNIFICANT CHANGE UP (ref 11.5–15.5)
IMM GRANULOCYTES # BLD AUTO: 0.03 K/UL — SIGNIFICANT CHANGE UP (ref 0–0.07)
IMM GRANULOCYTES NFR BLD AUTO: 0.3 % — SIGNIFICANT CHANGE UP (ref 0–0.9)
LYMPHOCYTES # BLD AUTO: 1.49 K/UL — SIGNIFICANT CHANGE UP (ref 1–3.3)
LYMPHOCYTES NFR BLD AUTO: 17.1 % — SIGNIFICANT CHANGE UP (ref 13–44)
MCHC RBC-ENTMCNC: 29 PG — SIGNIFICANT CHANGE UP (ref 27–34)
MCHC RBC-ENTMCNC: 31.9 G/DL — LOW (ref 32–36)
MCV RBC AUTO: 91 FL — SIGNIFICANT CHANGE UP (ref 80–100)
MONOCYTES # BLD AUTO: 0.59 K/UL — SIGNIFICANT CHANGE UP (ref 0–0.9)
MONOCYTES NFR BLD AUTO: 6.8 % — SIGNIFICANT CHANGE UP (ref 2–14)
NEUTROPHILS # BLD AUTO: 6.49 K/UL — SIGNIFICANT CHANGE UP (ref 1.8–7.4)
NEUTROPHILS NFR BLD AUTO: 74.5 % — SIGNIFICANT CHANGE UP (ref 43–77)
NRBC # BLD AUTO: 0 K/UL — SIGNIFICANT CHANGE UP (ref 0–0)
NRBC # FLD: 0 K/UL — SIGNIFICANT CHANGE UP (ref 0–0)
NRBC BLD AUTO-RTO: 0 /100 WBCS — SIGNIFICANT CHANGE UP (ref 0–0)
PLATELET # BLD AUTO: 301 K/UL — SIGNIFICANT CHANGE UP (ref 150–400)
PMV BLD: 11.5 FL — SIGNIFICANT CHANGE UP (ref 7–13)
POTASSIUM SERPL-MCNC: 4.1 MMOL/L — SIGNIFICANT CHANGE UP (ref 3.5–5.3)
POTASSIUM SERPL-SCNC: 4.1 MMOL/L — SIGNIFICANT CHANGE UP (ref 3.5–5.3)
PROT SERPL-MCNC: 6.8 G/DL — SIGNIFICANT CHANGE UP (ref 6.6–8.7)
RBC # BLD: 5.24 M/UL — HIGH (ref 3.8–5.2)
RBC # FLD: 13.7 % — SIGNIFICANT CHANGE UP (ref 10.3–14.5)
SODIUM SERPL-SCNC: 139 MMOL/L — SIGNIFICANT CHANGE UP (ref 135–145)
WBC # BLD: 8.71 K/UL — SIGNIFICANT CHANGE UP (ref 3.8–10.5)
WBC # FLD AUTO: 8.71 K/UL — SIGNIFICANT CHANGE UP (ref 3.8–10.5)

## 2025-04-02 PROCEDURE — 86900 BLOOD TYPING SEROLOGIC ABO: CPT

## 2025-04-02 PROCEDURE — 71260 CT THORAX DX C+: CPT | Mod: 26

## 2025-04-02 PROCEDURE — 36415 COLL VENOUS BLD VENIPUNCTURE: CPT

## 2025-04-02 PROCEDURE — 70450 CT HEAD/BRAIN W/O DYE: CPT | Mod: 26

## 2025-04-02 PROCEDURE — 93005 ELECTROCARDIOGRAM TRACING: CPT

## 2025-04-02 PROCEDURE — 71046 X-RAY EXAM CHEST 2 VIEWS: CPT

## 2025-04-02 PROCEDURE — 80053 COMPREHEN METABOLIC PANEL: CPT

## 2025-04-02 PROCEDURE — 71260 CT THORAX DX C+: CPT | Mod: MC

## 2025-04-02 PROCEDURE — 82962 GLUCOSE BLOOD TEST: CPT

## 2025-04-02 PROCEDURE — 70487 CT MAXILLOFACIAL W/DYE: CPT | Mod: MC

## 2025-04-02 PROCEDURE — 99285 EMERGENCY DEPT VISIT HI MDM: CPT

## 2025-04-02 PROCEDURE — 85025 COMPLETE CBC W/AUTO DIFF WBC: CPT

## 2025-04-02 PROCEDURE — 93010 ELECTROCARDIOGRAM REPORT: CPT

## 2025-04-02 PROCEDURE — 70450 CT HEAD/BRAIN W/O DYE: CPT | Mod: MC

## 2025-04-02 PROCEDURE — 70487 CT MAXILLOFACIAL W/DYE: CPT | Mod: 26

## 2025-04-02 PROCEDURE — 86901 BLOOD TYPING SEROLOGIC RH(D): CPT

## 2025-04-02 PROCEDURE — 99285 EMERGENCY DEPT VISIT HI MDM: CPT | Mod: 25

## 2025-04-02 PROCEDURE — 86850 RBC ANTIBODY SCREEN: CPT

## 2025-04-02 PROCEDURE — 71046 X-RAY EXAM CHEST 2 VIEWS: CPT | Mod: 26

## 2025-04-02 RX ADMIN — Medication 3 MILLILITER(S): at 11:08

## 2025-04-02 NOTE — ED PROVIDER NOTE - PATIENT PORTAL LINK FT
You can access the FollowMyHealth Patient Portal offered by [facility] by registering at the following website: [email or web address]. By joining [facility]’s FollowMyHealth portal, you will also be able to view your health information using other applications (apps) compatible with our system.

## 2025-04-02 NOTE — ED PROVIDER NOTE - CLINICAL SUMMARY MEDICAL DECISION MAKING FREE TEXT BOX
75-year-old female with history of hypertension hypercholesterolemia presents to the ED complaining of "spitting up blood" times last 3 days.  Patient states she was evaluated over the weekend at [facility] for elevated blood pressure and subsequent discharged home.  Patient states she feels mucus dripping down the back of her throat and when she spits it out she notices clots of blood which has been intermittent.  Patient denies any associated fever, chills, chest pain, shortness of breath, abdominal pain, nausea, vomiting or syncope.  Patient does admit to taking a baby aspirin but denies any other antiplatelet or anticoagulant medications.   patient was seen by her PCP on Monday and referred to ED to have further evaluation and imaging.  Patient with history of quarter to half pack per day 40-year history of smoking.   will check labs, chest x-ray and check CT head and sinuses and chest

## 2025-04-02 NOTE — ED ADULT NURSE REASSESSMENT NOTE - NS ED NURSE REASSESS COMMENT FT1
pt received from RN JUAN at 1200. pt breathing even and unlabored, family at bedside. pt in NAD, no new complaints.

## 2025-04-02 NOTE — ED ADULT NURSE NOTE - OBJECTIVE STATEMENT
assumed pt care at 1100.  pt is awake, alert and oriented x3 c/o spitting up blood since monday.  pt states that the blood seems to be coming from her sinuses that is going down her throat and then she is spitting it out.  on daily 81 mg asa, denies blood thinners. Respirations even and unlabored, denies chest pain.  Abdomen soft, nontender, nondistended.  Skin warm and dry.  Moving all extremities well and with purpose.

## 2025-04-02 NOTE — ED ADULT NURSE NOTE - NSFALLRISKINTERV_ED_ALL_ED

## 2025-04-02 NOTE — ED PROVIDER NOTE - NSFOLLOWUPCLINICS_GEN_ALL_ED_FT
Cardiology at [place] ([facility])  Cardiology  [address], [address]  [place] [address]  Phone: [phone]  Fax:   Follow Up Time: 4-6 Days

## 2025-04-02 NOTE — ED PROVIDER NOTE - OBJECTIVE STATEMENT
75-year-old female with history of hypertension hypercholesterolemia presents to the ED complaining of "spitting up blood" times last 3 days.  Patient states she was evaluated over the weekend at [facility] for elevated blood pressure and subsequent discharged home.  Patient states she feels mucus dripping down the back of her throat and when she spits it out she notices clots of blood which has been intermittent.  Patient denies any associated fever, chills, chest pain, shortness of breath, abdominal pain, nausea, vomiting or syncope.  Patient does admit to taking a baby aspirin but denies any other antiplatelet or anticoagulant medications.   patient was seen by her PCP on Monday and referred to ED to have further evaluation and imaging.  Patient with history of quarter to half pack per day 40-year history of smoking

## 2025-04-02 NOTE — ED ADULT TRIAGE NOTE - CHIEF COMPLAINT QUOTE
C/O spitting up blood and dizziness since Monday. Pt states she was seen at [facility] this weekend for htn. Pt states she takes baby asprin daily.

## 2025-04-02 NOTE — ED PROVIDER NOTE - CARE PROVIDER_API CALL
Rik Gotti  Pulmonary Disease  [address], [address]  [place] [address]  Phone: [phone]  Fax: [phone]  Follow Up Time: 7-10 Days

## 2025-04-02 NOTE — ED PROVIDER NOTE - PROGRESS NOTE DETAILS
Thad: Received signout.  Patient CT imaging of the head, max face, and chest were reviewed.  There are no acute actionable findings.  Patient is aware of the known pleural nodule, and is reassured by the fact that it has not changed.  Otherwise feeling well, no respiratory distress, ambulating freely.  Daughter at bedside, safe and stable for discharge.

## 2025-04-06 DIAGNOSIS — I10 ESSENTIAL (PRIMARY) HYPERTENSION: ICD-10-CM

## 2025-04-06 DIAGNOSIS — E78.00 PURE HYPERCHOLESTEROLEMIA, UNSPECIFIED: ICD-10-CM

## 2025-04-06 DIAGNOSIS — Z87.891 PERSONAL HISTORY OF NICOTINE DEPENDENCE: ICD-10-CM
